# Patient Record
Sex: FEMALE | Race: BLACK OR AFRICAN AMERICAN | URBAN - METROPOLITAN AREA
[De-identification: names, ages, dates, MRNs, and addresses within clinical notes are randomized per-mention and may not be internally consistent; named-entity substitution may affect disease eponyms.]

---

## 2018-04-23 ENCOUNTER — HOSPITAL LABORATORY (OUTPATIENT)
Dept: OTHER | Facility: CLINIC | Age: 63
End: 2018-04-23

## 2018-04-23 ENCOUNTER — HOSPITAL ENCOUNTER (OUTPATIENT)
Dept: LAB | Facility: CLINIC | Age: 63
Discharge: HOME | End: 2018-04-23
Attending: ORTHOPAEDIC SURGERY | Admitting: ORTHOPAEDIC SURGERY
Payer: COMMERCIAL

## 2018-04-23 DIAGNOSIS — Z01.812 PRE-OPERATIVE LABORATORY EXAMINATION: ICD-10-CM

## 2018-04-23 LAB
CREAT SERPL-MCNC: 0.77 MG/DL (ref 0.52–1.04)
ERYTHROCYTE [DISTWIDTH] IN BLOOD BY AUTOMATED COUNT: 13.2 % (ref 10–15)
FERRITIN SERPL-MCNC: 40 NG/ML (ref 8–252)
GFR SERPL CREATININE-BSD FRML MDRD: 76 ML/MIN/1.7M2
HCT VFR BLD AUTO: 38.1 % (ref 35–47)
HGB BLD-MCNC: 12.7 G/DL (ref 11.7–15.7)
HGB BLD-MCNC: NORMAL G/DL (ref 11.7–15.7)
IRON SATN MFR SERPL: 25 % (ref 15–46)
IRON SERPL-MCNC: 77 UG/DL (ref 35–180)
MCH RBC QN AUTO: 30.5 PG (ref 26.5–33)
MCHC RBC AUTO-ENTMCNC: 33.3 G/DL (ref 31.5–36.5)
MCV RBC AUTO: 91 FL (ref 78–100)
MRSA DNA SPEC QL NAA+PROBE: NEGATIVE
PLATELET # BLD AUTO: 205 10E9/L (ref 150–450)
RBC # BLD AUTO: 4.17 10E12/L (ref 3.8–5.2)
RETICS # AUTO: 95.5 10E9/L (ref 25–95)
RETICS/RBC NFR AUTO: 2.3 % (ref 0.5–2)
SPECIMEN SOURCE: NORMAL
TIBC SERPL-MCNC: 313 UG/DL (ref 240–430)
WBC # BLD AUTO: 4.5 10E9/L (ref 4–11)

## 2018-04-23 PROCEDURE — 87641 MR-STAPH DNA AMP PROBE: CPT | Performed by: ORTHOPAEDIC SURGERY

## 2018-04-23 PROCEDURE — 40000830 ZZHCL STATISTIC STAPH AUREUS METH RESIST SCREEN PCR: Performed by: ORTHOPAEDIC SURGERY

## 2018-04-23 PROCEDURE — 40000829 ZZHCL STATISTIC STAPH AUREUS SUSCEPT SCREEN PCR: Performed by: ORTHOPAEDIC SURGERY

## 2018-04-23 PROCEDURE — 87640 STAPH A DNA AMP PROBE: CPT | Performed by: ORTHOPAEDIC SURGERY

## 2018-04-25 ENCOUNTER — TRANSFERRED RECORDS (OUTPATIENT)
Dept: HEALTH INFORMATION MANAGEMENT | Facility: CLINIC | Age: 63
End: 2018-04-25

## 2018-05-18 RX ORDER — MULTIVITAMIN,THERAPEUTIC
1 TABLET ORAL DAILY
Status: ON HOLD | COMMUNITY
End: 2018-05-21

## 2018-05-18 RX ORDER — LATANOPROST 50 UG/ML
1 SOLUTION/ DROPS OPHTHALMIC AT BEDTIME
COMMUNITY

## 2018-05-18 RX ORDER — BRIMONIDINE TARTRATE 1 MG/ML
1 SOLUTION/ DROPS OPHTHALMIC 2 TIMES DAILY
COMMUNITY

## 2018-05-18 RX ORDER — OXYCODONE AND ACETAMINOPHEN 5; 325 MG/1; MG/1
1 TABLET ORAL EVERY 6 HOURS PRN
Status: ON HOLD | COMMUNITY
End: 2018-05-21

## 2018-05-18 RX ORDER — TIMOLOL MALEATE 5 MG/ML
1 SOLUTION/ DROPS OPHTHALMIC EVERY MORNING
COMMUNITY

## 2018-05-21 ENCOUNTER — ANESTHESIA (OUTPATIENT)
Dept: SURGERY | Facility: CLINIC | Age: 63
DRG: 470 | End: 2018-05-21
Payer: COMMERCIAL

## 2018-05-21 ENCOUNTER — HOSPITAL ENCOUNTER (INPATIENT)
Facility: CLINIC | Age: 63
LOS: 3 days | Discharge: HOME | DRG: 470 | End: 2018-05-24
Attending: ORTHOPAEDIC SURGERY | Admitting: ORTHOPAEDIC SURGERY
Payer: COMMERCIAL

## 2018-05-21 ENCOUNTER — ANESTHESIA EVENT (OUTPATIENT)
Dept: SURGERY | Facility: CLINIC | Age: 63
DRG: 470 | End: 2018-05-21
Payer: COMMERCIAL

## 2018-05-21 ENCOUNTER — APPOINTMENT (OUTPATIENT)
Dept: GENERAL RADIOLOGY | Facility: CLINIC | Age: 63
DRG: 470 | End: 2018-05-21
Attending: ORTHOPAEDIC SURGERY
Payer: COMMERCIAL

## 2018-05-21 DIAGNOSIS — Z96.651 STATUS POST TOTAL RIGHT KNEE REPLACEMENT: Primary | ICD-10-CM

## 2018-05-21 PROBLEM — Z96.659 S/P TOTAL KNEE ARTHROPLASTY: Status: ACTIVE | Noted: 2018-05-21

## 2018-05-21 LAB
ANION GAP SERPL CALCULATED.3IONS-SCNC: 4 MMOL/L (ref 3–14)
BUN SERPL-MCNC: 14 MG/DL (ref 7–30)
CALCIUM SERPL-MCNC: 9.1 MG/DL (ref 8.5–10.1)
CHLORIDE SERPL-SCNC: 107 MMOL/L (ref 94–109)
CO2 SERPL-SCNC: 30 MMOL/L (ref 20–32)
CREAT SERPL-MCNC: 0.62 MG/DL (ref 0.52–1.04)
GFR SERPL CREATININE-BSD FRML MDRD: >90 ML/MIN/1.7M2
GLUCOSE SERPL-MCNC: 85 MG/DL (ref 70–99)
HGB BLD-MCNC: 13.4 G/DL (ref 11.7–15.7)
PLATELET # BLD AUTO: 171 10E9/L (ref 150–450)
POTASSIUM SERPL-SCNC: 3.5 MMOL/L (ref 3.4–5.3)
SODIUM SERPL-SCNC: 141 MMOL/L (ref 133–144)

## 2018-05-21 PROCEDURE — 0SRC0J9 REPLACEMENT OF RIGHT KNEE JOINT WITH SYNTHETIC SUBSTITUTE, CEMENTED, OPEN APPROACH: ICD-10-PCS | Performed by: ORTHOPAEDIC SURGERY

## 2018-05-21 PROCEDURE — C1776 JOINT DEVICE (IMPLANTABLE): HCPCS | Performed by: ORTHOPAEDIC SURGERY

## 2018-05-21 PROCEDURE — 36000063 ZZH SURGERY LEVEL 4 EA 15 ADDTL MIN: Performed by: ORTHOPAEDIC SURGERY

## 2018-05-21 PROCEDURE — 25000128 H RX IP 250 OP 636: Performed by: ORTHOPAEDIC SURGERY

## 2018-05-21 PROCEDURE — 37000008 ZZH ANESTHESIA TECHNICAL FEE, 1ST 30 MIN: Performed by: ORTHOPAEDIC SURGERY

## 2018-05-21 PROCEDURE — 40000170 ZZH STATISTIC PRE-PROCEDURE ASSESSMENT II: Performed by: ORTHOPAEDIC SURGERY

## 2018-05-21 PROCEDURE — 27210794 ZZH OR GENERAL SUPPLY STERILE: Performed by: ORTHOPAEDIC SURGERY

## 2018-05-21 PROCEDURE — 36415 COLL VENOUS BLD VENIPUNCTURE: CPT | Performed by: ORTHOPAEDIC SURGERY

## 2018-05-21 PROCEDURE — 25000128 H RX IP 250 OP 636: Performed by: ANESTHESIOLOGY

## 2018-05-21 PROCEDURE — 27110028 ZZH OR GENERAL SUPPLY NON-STERILE: Performed by: ORTHOPAEDIC SURGERY

## 2018-05-21 PROCEDURE — 25000125 ZZHC RX 250: Performed by: ORTHOPAEDIC SURGERY

## 2018-05-21 PROCEDURE — 37000009 ZZH ANESTHESIA TECHNICAL FEE, EACH ADDTL 15 MIN: Performed by: ORTHOPAEDIC SURGERY

## 2018-05-21 PROCEDURE — 71000012 ZZH RECOVERY PHASE 1 LEVEL 1 FIRST HR: Performed by: ORTHOPAEDIC SURGERY

## 2018-05-21 PROCEDURE — 25000132 ZZH RX MED GY IP 250 OP 250 PS 637: Performed by: ORTHOPAEDIC SURGERY

## 2018-05-21 PROCEDURE — 27210995 ZZH RX 272: Performed by: ORTHOPAEDIC SURGERY

## 2018-05-21 PROCEDURE — 25000128 H RX IP 250 OP 636: Performed by: NURSE ANESTHETIST, CERTIFIED REGISTERED

## 2018-05-21 PROCEDURE — 27810169 ZZH OR IMPLANT GENERAL: Performed by: ORTHOPAEDIC SURGERY

## 2018-05-21 PROCEDURE — 36000093 ZZH SURGERY LEVEL 4 1ST 30 MIN: Performed by: ORTHOPAEDIC SURGERY

## 2018-05-21 PROCEDURE — 85049 AUTOMATED PLATELET COUNT: CPT | Performed by: ORTHOPAEDIC SURGERY

## 2018-05-21 PROCEDURE — 25800025 ZZH RX 258: Performed by: ORTHOPAEDIC SURGERY

## 2018-05-21 PROCEDURE — 80048 BASIC METABOLIC PNL TOTAL CA: CPT | Performed by: ORTHOPAEDIC SURGERY

## 2018-05-21 PROCEDURE — 25000125 ZZHC RX 250: Performed by: NURSE ANESTHETIST, CERTIFIED REGISTERED

## 2018-05-21 PROCEDURE — 12000007 ZZH R&B INTERMEDIATE

## 2018-05-21 PROCEDURE — 85018 HEMOGLOBIN: CPT | Performed by: ORTHOPAEDIC SURGERY

## 2018-05-21 PROCEDURE — 40000986 XR KNEE PORT RT 1/2 VW: Mod: RT

## 2018-05-21 PROCEDURE — 25000566 ZZH SEVOFLURANE, EA 15 MIN: Performed by: ORTHOPAEDIC SURGERY

## 2018-05-21 DEVICE — IMP BASEPLATE TIBIAL GENESIS II SZ 4 RT TI 71420184: Type: IMPLANTABLE DEVICE | Site: KNEE | Status: FUNCTIONAL

## 2018-05-21 DEVICE — IMP BONE CEMENT SIMPLEX W/GENTAMICIN 6195-1-001: Type: IMPLANTABLE DEVICE | Site: KNEE | Status: FUNCTIONAL

## 2018-05-21 DEVICE — IMP COMP PATELLA SNR RESURF GENESIS II 9X26MM 71420580: Type: IMPLANTABLE DEVICE | Site: KNEE | Status: FUNCTIONAL

## 2018-05-21 DEVICE — IMP COMP FEMORAL S&N LEGION PS NP NARROW 4 RT 71933653: Type: IMPLANTABLE DEVICE | Site: KNEE | Status: FUNCTIONAL

## 2018-05-21 RX ORDER — AMOXICILLIN 250 MG
2 CAPSULE ORAL 2 TIMES DAILY
Status: DISCONTINUED | OUTPATIENT
Start: 2018-05-21 | End: 2018-05-24 | Stop reason: HOSPADM

## 2018-05-21 RX ORDER — DIPHENHYDRAMINE HYDROCHLORIDE 50 MG/ML
25 INJECTION INTRAMUSCULAR; INTRAVENOUS EVERY 6 HOURS PRN
Status: DISCONTINUED | OUTPATIENT
Start: 2018-05-21 | End: 2018-05-24 | Stop reason: HOSPADM

## 2018-05-21 RX ORDER — DEXAMETHASONE SODIUM PHOSPHATE 4 MG/ML
INJECTION, SOLUTION INTRA-ARTICULAR; INTRALESIONAL; INTRAMUSCULAR; INTRAVENOUS; SOFT TISSUE PRN
Status: DISCONTINUED | OUTPATIENT
Start: 2018-05-21 | End: 2018-05-21

## 2018-05-21 RX ORDER — ONDANSETRON 2 MG/ML
4 INJECTION INTRAMUSCULAR; INTRAVENOUS EVERY 6 HOURS PRN
Status: DISCONTINUED | OUTPATIENT
Start: 2018-05-21 | End: 2018-05-24 | Stop reason: HOSPADM

## 2018-05-21 RX ORDER — BRIMONIDINE TARTRATE 1 MG/ML
1 SOLUTION/ DROPS OPHTHALMIC 2 TIMES DAILY
Status: DISCONTINUED | OUTPATIENT
Start: 2018-05-21 | End: 2018-05-24 | Stop reason: HOSPADM

## 2018-05-21 RX ORDER — HYDRALAZINE HYDROCHLORIDE 20 MG/ML
10 INJECTION INTRAMUSCULAR; INTRAVENOUS EVERY 4 HOURS PRN
Status: DISCONTINUED | OUTPATIENT
Start: 2018-05-21 | End: 2018-05-21

## 2018-05-21 RX ORDER — HYDROMORPHONE HYDROCHLORIDE 1 MG/ML
.3-.5 INJECTION, SOLUTION INTRAMUSCULAR; INTRAVENOUS; SUBCUTANEOUS
Status: DISCONTINUED | OUTPATIENT
Start: 2018-05-21 | End: 2018-05-24 | Stop reason: HOSPADM

## 2018-05-21 RX ORDER — FENTANYL CITRATE 50 UG/ML
25-50 INJECTION, SOLUTION INTRAMUSCULAR; INTRAVENOUS
Status: DISCONTINUED | OUTPATIENT
Start: 2018-05-21 | End: 2018-05-21 | Stop reason: HOSPADM

## 2018-05-21 RX ORDER — SODIUM CHLORIDE, SODIUM LACTATE, POTASSIUM CHLORIDE, CALCIUM CHLORIDE 600; 310; 30; 20 MG/100ML; MG/100ML; MG/100ML; MG/100ML
INJECTION, SOLUTION INTRAVENOUS CONTINUOUS
Status: DISCONTINUED | OUTPATIENT
Start: 2018-05-21 | End: 2018-05-21

## 2018-05-21 RX ORDER — LIDOCAINE 40 MG/G
CREAM TOPICAL
Status: DISCONTINUED | OUTPATIENT
Start: 2018-05-21 | End: 2018-05-24 | Stop reason: HOSPADM

## 2018-05-21 RX ORDER — DIPHENHYDRAMINE HCL 25 MG
25 CAPSULE ORAL EVERY 6 HOURS PRN
Status: DISCONTINUED | OUTPATIENT
Start: 2018-05-21 | End: 2018-05-24 | Stop reason: HOSPADM

## 2018-05-21 RX ORDER — DEXTROSE MONOHYDRATE, SODIUM CHLORIDE, AND POTASSIUM CHLORIDE 50; 1.49; 4.5 G/1000ML; G/1000ML; G/1000ML
INJECTION, SOLUTION INTRAVENOUS CONTINUOUS
Status: DISCONTINUED | OUTPATIENT
Start: 2018-05-21 | End: 2018-05-24 | Stop reason: HOSPADM

## 2018-05-21 RX ORDER — ONDANSETRON 4 MG/1
4 TABLET, ORALLY DISINTEGRATING ORAL EVERY 30 MIN PRN
Status: DISCONTINUED | OUTPATIENT
Start: 2018-05-21 | End: 2018-05-21 | Stop reason: HOSPADM

## 2018-05-21 RX ORDER — PROPOFOL 10 MG/ML
INJECTION, EMULSION INTRAVENOUS CONTINUOUS PRN
Status: DISCONTINUED | OUTPATIENT
Start: 2018-05-21 | End: 2018-05-21

## 2018-05-21 RX ORDER — ONDANSETRON 2 MG/ML
4 INJECTION INTRAMUSCULAR; INTRAVENOUS EVERY 30 MIN PRN
Status: DISCONTINUED | OUTPATIENT
Start: 2018-05-21 | End: 2018-05-21 | Stop reason: HOSPADM

## 2018-05-21 RX ORDER — ACETAMINOPHEN 325 MG/1
650 TABLET ORAL EVERY 4 HOURS PRN
Status: DISCONTINUED | OUTPATIENT
Start: 2018-05-24 | End: 2018-05-24 | Stop reason: HOSPADM

## 2018-05-21 RX ORDER — ZOLPIDEM TARTRATE 5 MG/1
5 TABLET ORAL
Status: DISCONTINUED | OUTPATIENT
Start: 2018-05-22 | End: 2018-05-24 | Stop reason: HOSPADM

## 2018-05-21 RX ORDER — ACETAMINOPHEN 325 MG/1
975 TABLET ORAL EVERY 8 HOURS
Status: DISCONTINUED | OUTPATIENT
Start: 2018-05-21 | End: 2018-05-24 | Stop reason: HOSPADM

## 2018-05-21 RX ORDER — CYCLOBENZAPRINE HCL 10 MG
10 TABLET ORAL 3 TIMES DAILY PRN
Status: DISCONTINUED | OUTPATIENT
Start: 2018-05-21 | End: 2018-05-24 | Stop reason: HOSPADM

## 2018-05-21 RX ORDER — HYDROMORPHONE HYDROCHLORIDE 1 MG/ML
.3-.5 INJECTION, SOLUTION INTRAMUSCULAR; INTRAVENOUS; SUBCUTANEOUS EVERY 5 MIN PRN
Status: DISCONTINUED | OUTPATIENT
Start: 2018-05-21 | End: 2018-05-21 | Stop reason: HOSPADM

## 2018-05-21 RX ORDER — TIMOLOL MALEATE 5 MG/ML
1 SOLUTION/ DROPS OPHTHALMIC EVERY MORNING
Status: DISCONTINUED | OUTPATIENT
Start: 2018-05-22 | End: 2018-05-24 | Stop reason: HOSPADM

## 2018-05-21 RX ORDER — LATANOPROST 50 UG/ML
1 SOLUTION/ DROPS OPHTHALMIC AT BEDTIME
Status: DISCONTINUED | OUTPATIENT
Start: 2018-05-21 | End: 2018-05-24 | Stop reason: HOSPADM

## 2018-05-21 RX ORDER — CEFAZOLIN SODIUM 1 G/3ML
1 INJECTION, POWDER, FOR SOLUTION INTRAMUSCULAR; INTRAVENOUS SEE ADMIN INSTRUCTIONS
Status: DISCONTINUED | OUTPATIENT
Start: 2018-05-21 | End: 2018-05-21

## 2018-05-21 RX ORDER — CEFAZOLIN SODIUM 2 G/100ML
2 INJECTION, SOLUTION INTRAVENOUS
Status: COMPLETED | OUTPATIENT
Start: 2018-05-21 | End: 2018-05-21

## 2018-05-21 RX ORDER — AMOXICILLIN 250 MG
1 CAPSULE ORAL 2 TIMES DAILY
Status: DISCONTINUED | OUTPATIENT
Start: 2018-05-21 | End: 2018-05-24 | Stop reason: HOSPADM

## 2018-05-21 RX ORDER — HYDROMORPHONE HYDROCHLORIDE 2 MG/1
2-4 TABLET ORAL
Status: DISCONTINUED | OUTPATIENT
Start: 2018-05-21 | End: 2018-05-22 | Stop reason: CLARIF

## 2018-05-21 RX ORDER — PROCHLORPERAZINE MALEATE 5 MG
10 TABLET ORAL EVERY 6 HOURS PRN
Status: DISCONTINUED | OUTPATIENT
Start: 2018-05-21 | End: 2018-05-24 | Stop reason: HOSPADM

## 2018-05-21 RX ORDER — KETOROLAC TROMETHAMINE 30 MG/ML
30 INJECTION, SOLUTION INTRAMUSCULAR; INTRAVENOUS EVERY 6 HOURS
Status: COMPLETED | OUTPATIENT
Start: 2018-05-21 | End: 2018-05-22

## 2018-05-21 RX ORDER — ONDANSETRON 4 MG/1
4 TABLET, ORALLY DISINTEGRATING ORAL EVERY 6 HOURS PRN
Status: DISCONTINUED | OUTPATIENT
Start: 2018-05-21 | End: 2018-05-24 | Stop reason: HOSPADM

## 2018-05-21 RX ORDER — NALOXONE HYDROCHLORIDE 0.4 MG/ML
.1-.4 INJECTION, SOLUTION INTRAMUSCULAR; INTRAVENOUS; SUBCUTANEOUS
Status: DISCONTINUED | OUTPATIENT
Start: 2018-05-21 | End: 2018-05-21

## 2018-05-21 RX ORDER — HYDRALAZINE HYDROCHLORIDE 20 MG/ML
10 INJECTION INTRAMUSCULAR; INTRAVENOUS EVERY 4 HOURS PRN
Status: DISCONTINUED | OUTPATIENT
Start: 2018-05-21 | End: 2018-05-24 | Stop reason: HOSPADM

## 2018-05-21 RX ORDER — CEFAZOLIN SODIUM 1 G/3ML
1 INJECTION, POWDER, FOR SOLUTION INTRAMUSCULAR; INTRAVENOUS EVERY 8 HOURS
Status: COMPLETED | OUTPATIENT
Start: 2018-05-21 | End: 2018-05-22

## 2018-05-21 RX ORDER — SODIUM CHLORIDE, SODIUM LACTATE, POTASSIUM CHLORIDE, CALCIUM CHLORIDE 600; 310; 30; 20 MG/100ML; MG/100ML; MG/100ML; MG/100ML
INJECTION, SOLUTION INTRAVENOUS CONTINUOUS
Status: DISCONTINUED | OUTPATIENT
Start: 2018-05-21 | End: 2018-05-21 | Stop reason: HOSPADM

## 2018-05-21 RX ORDER — MAGNESIUM HYDROXIDE 1200 MG/15ML
LIQUID ORAL PRN
Status: DISCONTINUED | OUTPATIENT
Start: 2018-05-21 | End: 2018-05-21 | Stop reason: HOSPADM

## 2018-05-21 RX ORDER — AMLODIPINE BESYLATE 5 MG/1
5 TABLET ORAL EVERY MORNING
Status: DISCONTINUED | OUTPATIENT
Start: 2018-05-22 | End: 2018-05-24 | Stop reason: HOSPADM

## 2018-05-21 RX ORDER — NALOXONE HYDROCHLORIDE 0.4 MG/ML
.1-.4 INJECTION, SOLUTION INTRAMUSCULAR; INTRAVENOUS; SUBCUTANEOUS
Status: DISCONTINUED | OUTPATIENT
Start: 2018-05-21 | End: 2018-05-24 | Stop reason: HOSPADM

## 2018-05-21 RX ADMIN — PHENYLEPHRINE HYDROCHLORIDE 100 MCG: 10 INJECTION, SOLUTION INTRAMUSCULAR; INTRAVENOUS; SUBCUTANEOUS at 15:02

## 2018-05-21 RX ADMIN — HYDROMORPHONE HYDROCHLORIDE 0.5 MG: 1 INJECTION, SOLUTION INTRAMUSCULAR; INTRAVENOUS; SUBCUTANEOUS at 17:56

## 2018-05-21 RX ADMIN — FENTANYL CITRATE 100 MCG: 50 INJECTION, SOLUTION INTRAMUSCULAR; INTRAVENOUS at 13:12

## 2018-05-21 RX ADMIN — DEXAMETHASONE SODIUM PHOSPHATE 4 MG: 4 INJECTION, SOLUTION INTRA-ARTICULAR; INTRALESIONAL; INTRAMUSCULAR; INTRAVENOUS; SOFT TISSUE at 13:16

## 2018-05-21 RX ADMIN — CEFAZOLIN SODIUM 2 G: 2 INJECTION, SOLUTION INTRAVENOUS at 13:15

## 2018-05-21 RX ADMIN — PROPOFOL 30 MCG/KG/MIN: 10 INJECTION, EMULSION INTRAVENOUS at 13:20

## 2018-05-21 RX ADMIN — CEFAZOLIN SODIUM 1 G: 1 INJECTION, POWDER, FOR SOLUTION INTRAMUSCULAR; INTRAVENOUS at 20:18

## 2018-05-21 RX ADMIN — SODIUM CHLORIDE, POTASSIUM CHLORIDE, SODIUM LACTATE AND CALCIUM CHLORIDE: 600; 310; 30; 20 INJECTION, SOLUTION INTRAVENOUS at 13:59

## 2018-05-21 RX ADMIN — MIDAZOLAM 1 MG: 1 INJECTION INTRAMUSCULAR; INTRAVENOUS at 13:05

## 2018-05-21 RX ADMIN — PHENYLEPHRINE HYDROCHLORIDE 100 MCG: 10 INJECTION, SOLUTION INTRAMUSCULAR; INTRAVENOUS; SUBCUTANEOUS at 13:16

## 2018-05-21 RX ADMIN — HYDROMORPHONE HYDROCHLORIDE 0.5 MG: 10 INJECTION, SOLUTION INTRAMUSCULAR; INTRAVENOUS; SUBCUTANEOUS at 13:47

## 2018-05-21 RX ADMIN — SENNOSIDES AND DOCUSATE SODIUM 1 TABLET: 8.6; 5 TABLET ORAL at 21:49

## 2018-05-21 RX ADMIN — HYDROMORPHONE HYDROCHLORIDE 0.5 MG: 1 INJECTION, SOLUTION INTRAMUSCULAR; INTRAVENOUS; SUBCUTANEOUS at 21:49

## 2018-05-21 RX ADMIN — ONDANSETRON 4 MG: 2 INJECTION INTRAMUSCULAR; INTRAVENOUS at 15:08

## 2018-05-21 RX ADMIN — PHENYLEPHRINE HYDROCHLORIDE 100 MCG: 10 INJECTION, SOLUTION INTRAMUSCULAR; INTRAVENOUS; SUBCUTANEOUS at 13:18

## 2018-05-21 RX ADMIN — SODIUM CHLORIDE, POTASSIUM CHLORIDE, SODIUM LACTATE AND CALCIUM CHLORIDE: 600; 310; 30; 20 INJECTION, SOLUTION INTRAVENOUS at 12:44

## 2018-05-21 RX ADMIN — ACETAMINOPHEN 975 MG: 325 TABLET ORAL at 17:56

## 2018-05-21 RX ADMIN — FENTANYL CITRATE 50 MCG: 50 INJECTION, SOLUTION INTRAMUSCULAR; INTRAVENOUS at 14:27

## 2018-05-21 RX ADMIN — PHENYLEPHRINE HYDROCHLORIDE 100 MCG: 10 INJECTION, SOLUTION INTRAMUSCULAR; INTRAVENOUS; SUBCUTANEOUS at 14:59

## 2018-05-21 RX ADMIN — KETOROLAC TROMETHAMINE 30 MG: 30 INJECTION, SOLUTION INTRAMUSCULAR at 21:49

## 2018-05-21 RX ADMIN — KETOROLAC TROMETHAMINE 30 MG: 30 INJECTION, SOLUTION INTRAMUSCULAR at 16:21

## 2018-05-21 RX ADMIN — POTASSIUM CHLORIDE, DEXTROSE MONOHYDRATE AND SODIUM CHLORIDE: 150; 5; 450 INJECTION, SOLUTION INTRAVENOUS at 17:10

## 2018-05-21 RX ADMIN — TRANEXAMIC ACID 1 G: 100 INJECTION, SOLUTION INTRAVENOUS at 15:07

## 2018-05-21 RX ADMIN — PHENYLEPHRINE HYDROCHLORIDE 100 MCG: 10 INJECTION, SOLUTION INTRAMUSCULAR; INTRAVENOUS; SUBCUTANEOUS at 15:14

## 2018-05-21 RX ADMIN — PHENYLEPHRINE HYDROCHLORIDE 100 MCG: 10 INJECTION, SOLUTION INTRAMUSCULAR; INTRAVENOUS; SUBCUTANEOUS at 13:22

## 2018-05-21 ASSESSMENT — LIFESTYLE VARIABLES: TOBACCO_USE: 0

## 2018-05-21 NOTE — IP AVS SNAPSHOT
MRN:[id number]                      After Visit Summary   5/21/2018    Thelma Leoah    MRN: [id number]           Thank you!     Thank you for choosing [facility] for your care. Our goal is always to provide you with excellent care. Hearing back from our patients is one way we can continue to improve our services. Please take a few minutes to complete the written survey that you may receive in the mail after you visit with us. Thank you!        Patient Information     Date Of Birth          1955        Designated Caregiver       Most Recent Value    Caregiver    Will someone help with your care after discharge? yes    Name of designated caregiver Franck     Phone number of caregiver     Caregiver address [address] [phone]      About your hospital stay     You were admitted on:  May 21, 2018 You last received care in the:  [facility] [address] [facility]    You were discharged on:  May 24, 2018        Reason for your hospital stay       TKA                  Who to Call     For medical emergencies, please call 911.  For non-urgent questions about your medical care, please call your primary care provider or clinic, [phone]  For questions related to your surgery, please call your surgery clinic        Attending Provider     Provider Kalyn Brock MD Orthopedics       Primary Care Provider Office Phone # Fax #    Ncha D Gerson [phone]      Follow-up Appointments     Follow-up and recommended labs and tests        Office visit prearranged                  Further instructions from your care team       DISCHARGE INSTRUCTIONS AFTER YOUR TOTAL KNEE REPLACEMENT     KALYN MAGAÑA MD       Instructions to care for your wound at home:   Change the dressing daily.  Inspect your incision at the time of dressing change for increased redness, tenderness, swelling, or drainage along the incision line.  Some bruising  or discoloration is usually present, but call my office for any changes in appearance that concern you.  Also call if you develop a fever above 101 degrees.     You may shower directly over the wound beginning 4 days after your surgery, but do not submerge the wound under water until after your post operative visit when the sutures are removed and the wound is completely sealed without drainage.    Activities:  Physical activity may be resumed gradually according to your comfort level. You may bear weight on your operative leg as tolerated with your crutches or walker as instructed by your therapist.  Follow your home exercise program.  Ice your knee after exercising.        Wear your knee immobilizer at night only until your office visit in 2 weeks to maintain full knee extension.  Do not use the immobilizer during the day unless otherwise instructed.      Wear the anti-embolism stockings day and night until seen in the office for your post operative visit. Remove them twice daily for one hour at a time. Keep the compression stockings flat on your leg.  Do not allow them to roll up or crease your skin.  Call if you develop calf pain.     Outpatient Physical Therapy and home exercises:  Outpatient physical therapy visits are required following discharge from the hospital. The referral for these outpatient therapy visits is routinely given to you at the time of your surgical scheduling. You should have already scheduled your therapy sessions in advance.  If you have not done so, please immediately call the therapy site of your choice to schedule the physical therapy regimen that has been prescribed for you.  You may discontinue the crutches or walker per the therapist's recommendation.      Medications:  New medications for you on discharge will include a pain medication, a stool softener while on the narcotic pain medication, and a blood thinner.  Detailed instructions will come with those medications.  You will  "also receive instructions on when to resume your home medications.     If you routinely take Aspirin 81 mg, hold the Aspirin while taking the formal blood thinner medication. Then take Aspirin 325 mg (1 tablet) daily for 4 weeks.  Then resume your Aspirin 81 mg daily if that is your routine.        Antibiotic coverage will be needed before any type of dental procedure.  This is a life long recommendation.  You should notify your dentist of your total knee surgery and call your dentist or our office one week before a dental appointment for antibiotics.        Clinic follow-up appointment:  Your clinic follow-up appointment has been prearranged.  Call [phone] with any questions.    Jimi Romero MD     Pending Results     No orders found from 5/19/2018 to 5/22/2018.            Statement of Approval     Ordered          05/23/18 0012  I have reviewed and agree with all the recommendations and orders detailed in this document.  EFFECTIVE NOW     Approved and electronically signed by:  Jimi Romero MD             Admission Information     Date & Time Provider Department Dept. Phone    5/21/2018 Jimi Romero MD [facility] [address] [facility] [phone]      Your Vitals Were     Blood Pressure Temperature Respirations Height Weight Pulse Oximetry    106/65 (BP Location: Left arm) 97.1  F (36.2  C) (Oral) 16 1.676 m (5' 6\") 57.6 kg (127 lb) 97%    BMI (Body Mass Index)                   20.5 kg/m2           [employer] Information     [employer] lets you send messages to your doctor, view your test results, renew your prescriptions, schedule appointments and more. To sign up, go to [email or web address] . Click on \"Log in\" on the left side of the screen, which will take you to the Welcome page. Then click on \"Sign up Now\" on the right side of the page.     You will be asked to enter the access code listed below, as well as some personal information. Please follow the directions to " create your username and password.     Your access code is: [email or web address]  Expires: 2018  4:08 PM     Your access code will  in 90 days. If you need help or a new code, please call your [facility] clinic or [phone].        Care EveryWhere ID     This is your Care EveryWhere ID. This could be used by other organizations to access your [facility] medical records  [id number]        Equal Access to Services     RIANNA GUAMAN : Hadii aad ku hadasho Soomaali, waaxda luqadaha, qaybta kaalmada adeegyada, waxay idiin hayaan adenicole villasenorjuanbrenda ch . So [facility] [phone].    ATENCIÓN: Si habla español, tiene a corona disposición servicios gratuitos de asistencia lingüística. Llame al [phone].    We comply with applicable federal civil rights laws and [place] laws. We do not discriminate on the basis of race, color, national origin, age, disability, sex, sexual orientation, or gender identity.               Review of your medicines      START taking        Dose / Directions    enoxaparin 40 MG/0.4ML injection   Commonly known as:  LOVENOX        Dose:  40 mg   Inject 0.4 mLs (40 mg) Subcutaneous every 24 hours for 10 days   Quantity:  4 mL   Refills:  0       HYDROmorphone 2 MG tablet   Commonly known as:  DILAUDID        Dose:  2-4 mg   Take 1-2 tablets (2-4 mg) by mouth every 3 hours as needed for moderate to severe pain   Quantity:  30 tablet   Refills:  0       ondansetron 4 MG ODT tab   Commonly known as:  ZOFRAN-ODT        Dose:  4 mg   Take 1 tablet (4 mg) by mouth every 6 hours as needed for nausea or vomiting   Quantity:  20 tablet   Refills:  0       order for DME        Equipment being ordered: Walker Wheels () and Walker () Treatment Diagnosis: difficulty walking   Quantity:  1 each   Refills:  0       senna-docusate 8.6-50 MG per tablet   Commonly known as:  SENOKOT-S;PERICOLACE        Dose:  2 tablet   Take 2 tablets by mouth 2 times daily   Quantity:  50 tablet   Refills:  0          CONTINUE these medicines which have NOT CHANGED        Dose / Directions    AMLODIPINE BESYLATE PO        Dose:  5 mg   Take 5 mg by mouth every morning   Refills:  0       brimonidine 0.1 % ophthalmic solution   Commonly known as:  ALPHAGAN P        Dose:  1 drop   Place 1 drop into both eyes 2 times daily   Refills:  0       capsicum oleoresin 0.025 % cream        Apply topically 4 times daily as needed (pain)   Refills:  0       latanoprost 0.005 % ophthalmic solution   Commonly known as:  XALATAN        Dose:  1 drop   Place 1 drop into both eyes At Bedtime   Refills:  0       timolol 0.5 % ophthalmic solution   Commonly known as:  TIMOPTIC        Dose:  1 drop   Place 1 drop into both eyes every morning   Refills:  0       TYLENOL PO        Dose:  325-650 mg   Take 325-650 mg by mouth every 4 hours as needed for mild pain or fever   Refills:  0         STOP taking     IBUPROFEN PO           NAPROXEN PO                Where to get your medicines      These medications were sent to [facility] - [address]  [address], [place] [address]     Phone:  [phone]     enoxaparin 40 MG/0.4ML injection    ondansetron 4 MG ODT tab    senna-docusate 8.6-50 MG per tablet         Some of these will need a paper prescription and others can be bought over the counter. Ask your nurse if you have questions.     Bring a paper prescription for each of these medications     HYDROmorphone 2 MG tablet    order for DME                Protect others around you: Learn how to safely use, store and throw away your medicines at www.disposemymeds.org.        Information about OPIOIDS     PRESCRIPTION OPIOIDS: WHAT YOU NEED TO KNOW   You have a prescription for an opioid (narcotic) pain medicine. Opioids can cause addiction. If you have a history of chemical dependency of any type, you are at a higher risk of becoming addicted to opioids. Only take this medicine after all other options  have been tried. Take it for as short a time and as few doses as possible.     Do not:    Drive. If you drive while taking these medicines, you could be arrested for driving under the influence (DUI).    Operate heavy machinery    Do any other dangerous activities while taking these medicines.     Drink any alcohol while taking these medicines.      Take with any other medicines that contain acetaminophen. Read all labels carefully. Look for the word  acetaminophen  or  Tylenol.  Ask your pharmacist if you have questions or are unsure.    Store your pills in a secure place, locked if possible. We will not replace any lost or stolen medicine. If you don t finish your medicine, please throw away (dispose) as directed by your pharmacist. The [place] Pollution Control Agency has more information about safe disposal: https://www.pca.[facility].[place].us/living-green/managing-unwanted-medications    All opioids tend to cause constipation. Drink plenty of water and eat foods that have a lot of fiber, such as fruits, vegetables, prune juice, apple juice and high-fiber cereal. Take a laxative (Miralax, milk of magnesia, Colace, Senna) if you don t move your bowels at least every other day.              Medication List: This is a list of all your medications and when to take them. Check marks below indicate your daily home schedule. Keep this list as a reference.      Medications           Morning Afternoon Evening Bedtime As Needed    AMLODIPINE BESYLATE PO   Take 5 mg by mouth every morning   Last time this was given:  5 mg on 5/24/2018  8:10 AM   Next Dose Due:  5/25/18 AM            5/25/18                       brimonidine 0.1 % ophthalmic solution   Commonly known as:  ALPHAGAN P   Place 1 drop into both eyes 2 times daily   Last time this was given:  1 drop on 5/23/2018  8:01 PM   Next Dose Due:  5/24/18 PM                    5/24/18               capsicum oleoresin 0.025 % cream   Apply topically 4 times daily as needed  (pain)   Next Dose Due:  Available as needed                                   enoxaparin 40 MG/0.4ML injection   Commonly known as:  LOVENOX   Inject 0.4 mLs (40 mg) Subcutaneous every 24 hours for 10 days   Last time this was given:  40 mg on 5/24/2018 11:17 AM   Next Dose Due:  5/25/18 11 AM                5/25/18                   HYDROmorphone 2 MG tablet   Commonly known as:  DILAUDID   Take 1-2 tablets (2-4 mg) by mouth every 3 hours as needed for moderate to severe pain   Last time this was given:  4 mg on 5/24/2018  1:23 PM   Next Dose Due:  Available as needed                                   latanoprost 0.005 % ophthalmic solution   Commonly known as:  XALATAN   Place 1 drop into both eyes At Bedtime   Last time this was given:  1 drop on 5/22/2018  9:51 PM   Next Dose Due:  5/25/18 Bedtime                        5/25/18           ondansetron 4 MG ODT tab   Commonly known as:  ZOFRAN-ODT   Take 1 tablet (4 mg) by mouth every 6 hours as needed for nausea or vomiting   Last time this was given:  4 mg on 5/23/2018  4:00 PM   Next Dose Due:  Available as needed                                   order for DME   Equipment being ordered: Walker Wheels () and Walker () Treatment Diagnosis: difficulty walking                                senna-docusate 8.6-50 MG per tablet   Commonly known as:  SENOKOT-S;PERICOLACE   Take 2 tablets by mouth 2 times daily   Last time this was given:  2 tablets on 5/24/2018  8:10 AM   Next Dose Due:  5/25/18 PM                    5/25/18               timolol 0.5 % ophthalmic solution   Commonly known as:  TIMOPTIC   Place 1 drop into both eyes every morning   Last time this was given:  1 drop on 5/22/2018  8:09 AM   Next Dose Due:  5/25/18 AM            5/25/18                       TYLENOL PO   Take 325-650 mg by mouth every 4 hours as needed for mild pain or fever   Last time this was given:  975 mg on 5/24/2018 11:16 AM   Next Dose Due:  Available as needed

## 2018-05-21 NOTE — PROGRESS NOTES
Admission medication history interview status for the 5/21/2018  admission is complete. See EPIC admission navigator for prior to admission medications     Medication history source reliability:Good    Medication history interview source(s):Daughter    Medication history resources (including written lists, pill bottles, clinic record):None    Primary pharmacy.[facility]    Additional medication history information not noted on PTA med list :None    Time spent in this activity: 30 minutes    Prior to Admission medications    Medication Sig Last Dose Taking? Auth Provider   Acetaminophen (TYLENOL PO) Take 325-650 mg by mouth every 4 hours as needed for mild pain or fever Over 2 weeks ago at prn Yes Reported, Patient   AMLODIPINE BESYLATE PO Take 5 mg by mouth every morning  5/21/2018 at 0500 Yes Reported, Patient   brimonidine (ALPHAGAN P) 0.1 % ophthalmic solution Place 1 drop into both eyes 2 times daily 5/20/2018 at am Yes Reported, Patient   capsicum oleoresin 0.025 % cream Apply topically 4 times daily as needed (pain) Over 2 weeks ago at prn Yes Reported, Patient   IBUPROFEN PO Take 400 mg by mouth 3 times daily as needed  Over 1 month ago at prn Yes Reported, Patient   latanoprost (XALATAN) 0.005 % ophthalmic solution Place 1 drop into both eyes At Bedtime 5/19/2018 at hs Yes Reported, Patient   NAPROXEN PO Take 500 mg by mouth 2 times daily as needed for moderate pain Over 2 weeks ago at prn Yes Reported, Patient   timolol (TIMOPTIC) 0.5 % ophthalmic solution Place 1 drop into both eyes every morning  5/20/2018 at am Yes Reported, Patient

## 2018-05-21 NOTE — PROVIDER NOTIFICATION
Hospitalist service consulted for post-op medical co-management. Patient is POD 0 s/p right total knee arthroplasty for OA. Pre-op H&P reviewed. She has a past medical history significant for hypertension and glaucoma. Prior to admission she on a regimen of eye drops for her glaucoma and amlodipine 5mg for blood pressures. BP has been stable throughout the day. Will order her amlodipine to be resumed 5/22 with hold parameters. I will also order prn hydralazine for SBp >180. Discussed with nursing who has no concerns at this time, patient is doing well.   Will defer formal consult at this time. Please do not hesitate to call with any questions or concerns. We are more than happy to see the patient if issues arise.    Inez Grossman PA-C

## 2018-05-21 NOTE — IP AVS SNAPSHOT
[address] [facility]    [address] [place] [address]    Phone:  [phone]                                       After Visit Summary   5/21/2018    Thelma Leon    MRN: [id number]           After Visit Summary Signature Page     I have received my discharge instructions, and my questions have been answered. I have discussed any challenges I see with this plan with the nurse or doctor.    ..........................................................................................................................................  Patient/Patient Representative Signature      ..........................................................................................................................................  Patient Representative Print Name and Relationship to Patient    ..................................................               ................................................  Date                                            Time    ..........................................................................................................................................  Reviewed by Signature/Title    ...................................................              ..............................................  Date                                                            Time

## 2018-05-21 NOTE — OP NOTE
PATIENT NAME:  Thelma Crowley [facility]  MEDICAL RECORD #: [id number]  PATIENT BIRTHDAY:  1955  DATE OF SURGERY: 5/21/2018    SURGEON:    Jimi Romero MD    1st ASSISTANT:  RHETT Sanders OPA-C    PREOPERATIVE DIAGNOSIS:  Degenerative osteoarthritis right knee.    POSTOPERATIVE DIAGNOSIS:  Degenerative osteoarthritis right knee.    PROCEDURE: right total knee arthroplasty.    COMPONENTS: [employer] - Size 4N PS femoral component, size 4 fully stemmed tibial baseplate with 21 mm PS XLPE high flexion articular insert, and 26 mm patellar component.    ANESTHESIA: General     INDICATIONS FOR PROCEDURE:  The patient was brought to the operating room for elective total knee replacement for advanced degenerative osteoarthritis.  The patient received IV antibiotics preoperatively.  These will be continued for 24 hours.  The patient also will receive anticoagulants  for postoperative thrombosis prophylaxis.  The patient understands the indications, alternatives, risks, benefits, and time involved for recovery and wishes to proceed.  The patient is consented for the procedure.      DESCRIPTION OF PROCEDURE:  The patient was brought to the operating room  and following suitable General anesthesia, the right knee was prepped and draped in the usual manner.  Full timeout was carried out and the patient and proper extremity and operative site identified and confirmed by all members of the operative team.    We next exsanguinated the operative leg with a Adi bandage and the tourniquet was elevated to 350 mmHg on the thigh.    She had severe valgus deformity along with 25 degrees of hyperextension due to lateral femoral condyle hypoplasia and severe lateral compartment wear.    A 10 cm longitudinal incision was made anteriorly for the MIS technique.  Sharp dissection was carried down through the subcutaneous tissue.  A median parapatellar arthrotomy was carried out and the knee flexed to 90 degrees.    There was  moderate wear in the medial compartment and severe wear in the patellofemoral  compartment and extreme wear in the lateral compartment with lateral femoral hypoplasia.    The [employer] instrumentation was used.  The femur was cut in 6 degrees of valgus for a size 4N PS  implant.  The tibia was cut for a size 4 fully stemmed base plate.  The patella was cut for a 26 mm patellar button.    The trial components were removed from the knee.  The bone surfaces were prepared with jet lavage and careful drying technique.     The posterior capsule was injected for postoperative relief with 30 cc of saline, 30 cc of 0.2% Ropivacaine, and 15 mg of Toradol.       We then cemented the components with HD Simplex cement with Gentamycin beginning with the tibial baseplate, followed by the femoral component, followed by the patellar component.    The knee was held in extension with the trial insert in place in the tibia until the cement was set.  Once the cement was set up, the trial component was removed.  We selected the 21 mm PS XLPE high flexion articular insert.  This insert was secured and the knee checked one final time for motion, stability, alignment and soft tissue balance, all of which were excellent.    The wound was irrigated throughout with antibiotic solution using jet lavage.  The tourniquet was deflated prior to wound closure and all bleeding controlled with electrocautery.  .    The wound was closed over a medium Hemovac drain with spaced 0 Ethibond interrupted and V-Loc running suture in the parapatellar arthrotomy, 2-0 undyed Vicryl in subcutaneous tissue and skin staples in the skin.  A sterile soft dressing was applied.  The tourniquet deflated one final time.  The patient was taken to the PAR in satisfactory condition.  There were no known complications during the procedure.    A surgical assistant was medically necessary for this procedure for pre-op positioning as well as intra-op retraction and  extremity support and positioning.  He was present for the entire procedure.      KALYN MAGAÑA MD    CC  Kalyn Magaña MD          [phone] Fax

## 2018-05-21 NOTE — ANESTHESIA PROCEDURE NOTES
Peripheral nerve/Neuraxial procedure note : Adductor canal  Pre-Procedure  Performed by MANDEEP RUEDA  Location: pre-op      Pre-Anesthestic Checklist: patient identified, IV checked, site marked, risks and benefits discussed, informed consent, monitors and equipment checked, at physician/surgeon's request and post-op pain management    Timeout  Correct Patient: Yes   Correct Procedure: Yes   Correct Site: Yes   Correct Laterality: Yes   Correct Position: Yes   Site Marked: Yes   .   Procedure Documentation    .    Procedure:  right  Adductor canal.  Local skin infiltrated with 3 mL of 1% lidocaine.     Ultrasound used to identify targeted nerve, plexus, or vascular marker and placed a needle adjacent to it., Ultrasound was used to visualize the spread of the anesthetic in close proximity to the above stated nerve. A permanent image is entered into the patient's record.  Patient Prep;mask, sterile gloves, chlorhexidine gluconate and isopropyl alcohol, patient draped.  Nerve Stim: Initial Level 1 mA. Lowest motor response mA..  Needle: insulated, short bevel Needle Gauge: 20.    Needle Length (Inches) 2  .       Assessment/Narrative  Paresthesias: No.  .  The placement was negative for: blood aspirated, painful injection and site bleeding.  Bolus given via needle..   Secured via.   Complications: none. Comments:  Single shot femoral nerve block  30 ml 0.5% Ropivacaine with 1:400,000 Epinephrine

## 2018-05-21 NOTE — OR NURSING
with pt throughout pacu stay. Clothing bag with pt on cart. VSS. Ok to transfer to floor per Dr Vides.

## 2018-05-21 NOTE — ANESTHESIA CARE TRANSFER NOTE
Patient: [facility]    Procedure(s):  RIGHT TOTAL KNEE ARTHROPLASTY  - Wound Class: I-Clean    Diagnosis: right knee djd  Diagnosis Additional Information: No value filed.    Anesthesia Type:   Spinal, Peripheral Nerve Block     Note:  Airway :Face Mask  Patient transferred to:PACU  Comments: At end of procedure, spontaneous respirations, adequate tidal volumes, followed commands to voice, LMA removed atraumatically, oropharynx suctioned, airway patent after LMA removal. Oxygen via facemask at 6 liters per minute to PACU. Oxygen tubing connected to wall O2 in PACU, SpO2, NiBP, and EKG monitors and alarms on and functioning, Donna Hugger warmer connected to patient gown, report on patient's clinical status given to PACU RN, RN questions answered.Handoff Report: Identifed the Patient, Identified the Reponsible Provider, Reviewed the pertinent medical history, Discussed the surgical course, Reviewed Intra-OP anesthesia mangement and issues during anesthesia, Set expectations for post-procedure period and Allowed opportunity for questions and acknowledgement of understanding      Vitals: (Last set prior to Anesthesia Care Transfer)    CRNA VITALS  5/21/2018 1509 - 5/21/2018 1543      5/21/2018             NIBP: 121/82    NIBP Mean: 94                Electronically Signed By: CECY Peterson CRNA  May 21, 2018  3:43 PM

## 2018-05-21 NOTE — ANESTHESIA PREPROCEDURE EVALUATION
Procedure: Procedure(s):  ARTHROPLASTY KNEE  Preop diagnosis: right knee djd    Allergies   Allergen Reactions     Oxycodone Nausea and Vomiting     Tramadol Nausea and Vomiting     Past Medical History:   Diagnosis Date     Chronic pain of right knee      Glaucoma      Hypertension      No past surgical history on file.  Social History   Substance Use Topics     Smoking status: Never Smoker     Smokeless tobacco: Never Used     Alcohol use No     Prior to Admission medications    Medication Sig Start Date End Date Taking? Authorizing Provider   Acetaminophen (TYLENOL PO) Take 325-650 mg by mouth every 4 hours as needed for mild pain or fever   Yes Reported, Patient   AMLODIPINE BESYLATE PO Take 5 mg by mouth every morning    Yes Reported, Patient   brimonidine (ALPHAGAN P) 0.1 % ophthalmic solution Place 1 drop into both eyes 2 times daily   Yes Reported, Patient   capsicum oleoresin 0.025 % cream Apply topically 4 times daily as needed (pain)   Yes Reported, Patient   IBUPROFEN PO Take 400 mg by mouth 3 times daily as needed    Yes Reported, Patient   latanoprost (XALATAN) 0.005 % ophthalmic solution Place 1 drop into both eyes At Bedtime   Yes Reported, Patient   NAPROXEN PO Take 500 mg by mouth 2 times daily as needed for moderate pain   Yes Reported, Patient   timolol (TIMOPTIC) 0.5 % ophthalmic solution Place 1 drop into both eyes every morning    Yes Reported, Patient     Current Facility-Administered Medications Ordered in Epic   Medication Dose Route Frequency Last Rate Last Dose     ceFAZolin (ANCEF) 1 g vial to attach to  ml bag for ADULT or 50 ml bag for PEDS  1 g Intravenous See Admin Instructions         ceFAZolin (ANCEF) intermittent infusion 2 g in 100 mL dextrose PRE-MIX  2 g Intravenous Pre-Op/Pre-procedure x 1 dose         fentaNYL (PF) (SUBLIMAZE) injection 25-50 mcg  25-50 mcg Intravenous Q2 Min PRN         HYDROmorphone (PF) (DILAUDID) injection 0.3-0.5 mg  0.3-0.5 mg Intravenous Q5 Min  PRN         lactated ringers infusion   Intravenous Continuous 25 mL/hr at 05/21/18 1244       lactated ringers infusion   Intravenous Continuous         lidocaine 1 % 1 mL  1 mL Other Q1H PRN         ondansetron (ZOFRAN-ODT) ODT tab 4 mg  4 mg Oral Q30 Min PRN        Or     ondansetron (ZOFRAN) injection 4 mg  4 mg Intravenous Q30 Min PRN         prochlorperazine (COMPAZINE) injection 10 mg  10 mg Intravenous Q6H PRN         sodium chloride (PF) 0.9% PF flush 3 mL  3 mL Intracatheter Q8H         tranexamic acid (CYKLOKAPRON) 1 g in sodium chloride 0.9 % 60 mL bolus  1 g Intravenous Once         No current Epic-ordered outpatient prescriptions on file.       lactated ringers 25 mL/hr at 05/21/18 1244     lactated ringers       Wt Readings from Last 1 Encounters:   05/21/18 57.6 kg (127 lb)     Temp Readings from Last 1 Encounters:   05/21/18 36.7  C (98  F) (Temporal)     BP Readings from Last 6 Encounters:   05/21/18 144/89     Pulse Readings from Last 4 Encounters:   No data found for Pulse     Resp Readings from Last 1 Encounters:   05/21/18 18     SpO2 Readings from Last 1 Encounters:   05/21/18 100%     Recent Labs   Lab Test  04/23/18   1208   CR  0.77     No results for input(s): AST, ALT, ALKPHOS, BILITOTAL, LIPASE in the last 26429 hours.  Recent Labs   Lab Test  04/23/18   1208   WBC  4.5   HGB  12.7  Canceled, Test credited   PLT  205     No results for input(s): ABO, RH in the last 55498 hours.  No results for input(s): INR, PTT in the last 14607 hours.   No results for input(s): TROPI in the last 82596 hours.  No results for input(s): PH, PCO2, PO2, HCO3 in the last 89200 hours.  No results for input(s): HCG in the last 34152 hours.  No results found for this or any previous visit (from the past 744 hour(s)).    RECENT LABS:   ECG:   ECHO:     Anesthesia Evaluation     . Pt has not had prior anesthetic            ROS/MED HX    ENT/Pulmonary:      (-) tobacco use and sleep apnea   Neurologic:        Cardiovascular:     (+) hypertension----. : . . . :. .       METS/Exercise Tolerance:     Hematologic:         Musculoskeletal:   (+) arthritis, , , -       GI/Hepatic:        (-) GERD   Renal/Genitourinary:         Endo:         Psychiatric:         Infectious Disease:         Malignancy:         Other:                     Physical Exam  Normal systems: cardiovascular and pulmonary    Airway   Mallampati: I  Neck ROM: full    Dental   (+) caps    Cardiovascular       Pulmonary                     Anesthesia Plan      History & Physical Review  History and physical reviewed and following examination; no interval change.    ASA Status:  2 .    NPO Status:  > 8 hours    Plan for General, LMA and Peripheral Nerve Block with Intravenous induction. Maintenance will be Balanced.    PONV prophylaxis:  Ondansetron (or other 5HT-3)       Postoperative Care  Postoperative pain management:  IV analgesics and Peripheral nerve block (Single Shot).      Consents  Anesthetic plan, risks, benefits and alternatives discussed with:  Patient..            Procedure: Procedure(s):  ARTHROPLASTY KNEE  Preop diagnosis: right knee djd    Allergies   Allergen Reactions     Oxycodone Nausea and Vomiting     Tramadol Nausea and Vomiting     Past Medical History:   Diagnosis Date     Chronic pain of right knee      Glaucoma      Hypertension      No past surgical history on file.  Social History   Substance Use Topics     Smoking status: Never Smoker     Smokeless tobacco: Never Used     Alcohol use No     Prior to Admission medications    Medication Sig Start Date End Date Taking? Authorizing Provider   Acetaminophen (TYLENOL PO) Take 325-650 mg by mouth every 4 hours as needed for mild pain or fever   Yes Reported, Patient   AMLODIPINE BESYLATE PO Take 5 mg by mouth every morning    Yes Reported, Patient   brimonidine (ALPHAGAN P) 0.1 % ophthalmic solution Place 1 drop into both eyes 2 times daily   Yes Reported, Patient   capsicum  oleoresin 0.025 % cream Apply topically 4 times daily as needed (pain)   Yes Reported, Patient   IBUPROFEN PO Take 400 mg by mouth 3 times daily as needed    Yes Reported, Patient   latanoprost (XALATAN) 0.005 % ophthalmic solution Place 1 drop into both eyes At Bedtime   Yes Reported, Patient   NAPROXEN PO Take 500 mg by mouth 2 times daily as needed for moderate pain   Yes Reported, Patient   timolol (TIMOPTIC) 0.5 % ophthalmic solution Place 1 drop into both eyes every morning    Yes Reported, Patient     Current Facility-Administered Medications Ordered in Epic   Medication Dose Route Frequency Last Rate Last Dose     ceFAZolin (ANCEF) 1 g vial to attach to  ml bag for ADULT or 50 ml bag for PEDS  1 g Intravenous See Admin Instructions         ceFAZolin 1000 mg + gentamicin 120 mg + NaCl 0.9% 1000 mL Bag    PRN   1,000 mL at 05/21/18 1338     dexamethasone (DECADRON) injection    PRN   4 mg at 05/21/18 1316     fentaNYL (PF) (SUBLIMAZE) injection 25-50 mcg  25-50 mcg Intravenous Q2 Min PRN   50 mcg at 05/21/18 1427     HYDROmorphone (PF) (DILAUDID) injection 0.3-0.5 mg  0.3-0.5 mg Intravenous Q5 Min PRN   0.5 mg at 05/21/18 1347     lactated ringers infusion   Intravenous Continuous 25 mL/hr at 05/21/18 1244       lactated ringers infusion   Intravenous Continuous         lidocaine 1 % 1 mL  1 mL Other Q1H PRN         midazolam (VERSED) injection    PRN   1 mg at 05/21/18 1305     ondansetron (ZOFRAN-ODT) ODT tab 4 mg  4 mg Oral Q30 Min PRN        Or     ondansetron (ZOFRAN) injection 4 mg  4 mg Intravenous Q30 Min PRN   4 mg at 05/21/18 1508     phenylephrine (ROYER-SYNEPHRINE) injection 1 mg  1 mg  Continuous PRN   100 mcg at 05/21/18 1514     prochlorperazine (COMPAZINE) injection 10 mg  10 mg Intravenous Q6H PRN         propofol (DIPRIVAN) infusion   Intravenous Continuous PRN   Stopped at 05/21/18 1459     ropivacaine 0.2% 30 mL + NaCl 0.9% 30 mL + ketorolac 15 mg    PRN   60.5 mL at 05/21/18 1515      sodium chloride (PF) 0.9% PF flush 3 mL  3 mL Intracatheter Q8H         sodium chloride 0.9% (bottle) irrigation    PRN   1,000 mL at 05/21/18 1338     No current Epic-ordered outpatient prescriptions on file.       lactated ringers 25 mL/hr at 05/21/18 1244     lactated ringers       Wt Readings from Last 1 Encounters:   05/21/18 57.6 kg (127 lb)     Temp Readings from Last 1 Encounters:   05/21/18 36.7  C (98  F) (Temporal)     BP Readings from Last 6 Encounters:   05/21/18 144/89     Pulse Readings from Last 4 Encounters:   No data found for Pulse     Resp Readings from Last 1 Encounters:   05/21/18 18     SpO2 Readings from Last 1 Encounters:   05/21/18 100%     Recent Labs   Lab Test  05/21/18   1235  04/23/18   1208   NA  141   --    POTASSIUM  3.5   --    CHLORIDE  107   --    CO2  30   --    ANIONGAP  4   --    GLC  85   --    BUN  14   --    CR  0.62  0.77   ROGER  9.1   --      No results for input(s): AST, ALT, ALKPHOS, BILITOTAL, LIPASE in the last 52470 hours.  Recent Labs   Lab Test  05/21/18   1235  04/23/18   1208   WBC   --   4.5   HGB  13.4  12.7  Canceled, Test credited   PLT   --   205     No results for input(s): ABO, RH in the last 47262 hours.  No results for input(s): INR, PTT in the last 20511 hours.   No results for input(s): TROPI in the last 98998 hours.  No results for input(s): PH, PCO2, PO2, HCO3 in the last 14731 hours.  No results for input(s): HCG in the last 03387 hours.  No results found for this or any previous visit (from the past 744 hour(s)).    RECENT LABS:   ECG:   ECHO:                 .

## 2018-05-22 ENCOUNTER — APPOINTMENT (OUTPATIENT)
Dept: PHYSICAL THERAPY | Facility: CLINIC | Age: 63
DRG: 470 | End: 2018-05-22
Attending: ORTHOPAEDIC SURGERY
Payer: COMMERCIAL

## 2018-05-22 LAB
ANION GAP SERPL CALCULATED.3IONS-SCNC: 5 MMOL/L (ref 3–14)
BUN SERPL-MCNC: 15 MG/DL (ref 7–30)
CALCIUM SERPL-MCNC: 8.4 MG/DL (ref 8.5–10.1)
CHLORIDE SERPL-SCNC: 107 MMOL/L (ref 94–109)
CO2 SERPL-SCNC: 29 MMOL/L (ref 20–32)
CREAT SERPL-MCNC: 0.6 MG/DL (ref 0.52–1.04)
GFR SERPL CREATININE-BSD FRML MDRD: >90 ML/MIN/1.7M2
GLUCOSE SERPL-MCNC: 122 MG/DL (ref 70–99)
HGB BLD-MCNC: 10.5 G/DL (ref 11.7–15.7)
PLATELET # BLD AUTO: 133 10E9/L (ref 150–450)
POTASSIUM SERPL-SCNC: 3.8 MMOL/L (ref 3.4–5.3)
SODIUM SERPL-SCNC: 141 MMOL/L (ref 133–144)

## 2018-05-22 PROCEDURE — 97530 THERAPEUTIC ACTIVITIES: CPT | Mod: GP | Performed by: PHYSICAL THERAPIST

## 2018-05-22 PROCEDURE — 36415 COLL VENOUS BLD VENIPUNCTURE: CPT | Performed by: ORTHOPAEDIC SURGERY

## 2018-05-22 PROCEDURE — 80048 BASIC METABOLIC PNL TOTAL CA: CPT | Performed by: ORTHOPAEDIC SURGERY

## 2018-05-22 PROCEDURE — 40000193 ZZH STATISTIC PT WARD VISIT: Performed by: PHYSICAL THERAPIST

## 2018-05-22 PROCEDURE — 97110 THERAPEUTIC EXERCISES: CPT | Mod: GP | Performed by: PHYSICAL THERAPIST

## 2018-05-22 PROCEDURE — 25000128 H RX IP 250 OP 636: Performed by: ORTHOPAEDIC SURGERY

## 2018-05-22 PROCEDURE — 97162 PT EVAL MOD COMPLEX 30 MIN: CPT | Mod: GP | Performed by: PHYSICAL THERAPIST

## 2018-05-22 PROCEDURE — 97116 GAIT TRAINING THERAPY: CPT | Mod: GP | Performed by: PHYSICAL THERAPIST

## 2018-05-22 PROCEDURE — 25000125 ZZHC RX 250: Performed by: ORTHOPAEDIC SURGERY

## 2018-05-22 PROCEDURE — 25000132 ZZH RX MED GY IP 250 OP 250 PS 637: Performed by: ORTHOPAEDIC SURGERY

## 2018-05-22 PROCEDURE — 85018 HEMOGLOBIN: CPT | Performed by: ORTHOPAEDIC SURGERY

## 2018-05-22 PROCEDURE — 85049 AUTOMATED PLATELET COUNT: CPT | Performed by: ORTHOPAEDIC SURGERY

## 2018-05-22 PROCEDURE — 12000007 ZZH R&B INTERMEDIATE

## 2018-05-22 PROCEDURE — 25000132 ZZH RX MED GY IP 250 OP 250 PS 637: Performed by: PHYSICIAN ASSISTANT

## 2018-05-22 RX ORDER — TRAMADOL HYDROCHLORIDE 50 MG/1
50 TABLET ORAL EVERY 6 HOURS PRN
Status: DISCONTINUED | OUTPATIENT
Start: 2018-05-22 | End: 2018-05-22

## 2018-05-22 RX ADMIN — AMLODIPINE BESYLATE 5 MG: 5 TABLET ORAL at 08:06

## 2018-05-22 RX ADMIN — LATANOPROST 1 DROP: 50 SOLUTION/ DROPS OPHTHALMIC at 00:11

## 2018-05-22 RX ADMIN — ACETAMINOPHEN 975 MG: 325 TABLET ORAL at 16:06

## 2018-05-22 RX ADMIN — TIMOLOL MALEATE 1 DROP: 5 SOLUTION/ DROPS OPHTHALMIC at 08:09

## 2018-05-22 RX ADMIN — KETOROLAC TROMETHAMINE 30 MG: 30 INJECTION, SOLUTION INTRAMUSCULAR at 04:38

## 2018-05-22 RX ADMIN — BRIMONIDINE TARTRATE 1 DROP: 1 SOLUTION/ DROPS OPHTHALMIC at 20:06

## 2018-05-22 RX ADMIN — ENOXAPARIN SODIUM 40 MG: 40 INJECTION SUBCUTANEOUS at 10:51

## 2018-05-22 RX ADMIN — HYDROMORPHONE HYDROCHLORIDE 2 MG: 2 TABLET ORAL at 12:19

## 2018-05-22 RX ADMIN — BRIMONIDINE TARTRATE 1 DROP: 1 SOLUTION/ DROPS OPHTHALMIC at 08:09

## 2018-05-22 RX ADMIN — BRIMONIDINE TARTRATE 1 DROP: 1 SOLUTION/ DROPS OPHTHALMIC at 00:11

## 2018-05-22 RX ADMIN — HYDROMORPHONE HYDROCHLORIDE 0.5 MG: 1 INJECTION, SOLUTION INTRAMUSCULAR; INTRAVENOUS; SUBCUTANEOUS at 21:50

## 2018-05-22 RX ADMIN — ACETAMINOPHEN 975 MG: 325 TABLET ORAL at 08:07

## 2018-05-22 RX ADMIN — ONDANSETRON 4 MG: 2 INJECTION INTRAMUSCULAR; INTRAVENOUS at 00:11

## 2018-05-22 RX ADMIN — KETOROLAC TROMETHAMINE 30 MG: 30 INJECTION, SOLUTION INTRAMUSCULAR at 10:51

## 2018-05-22 RX ADMIN — CEFAZOLIN SODIUM 1 G: 1 INJECTION, POWDER, FOR SOLUTION INTRAMUSCULAR; INTRAVENOUS at 04:40

## 2018-05-22 RX ADMIN — ONDANSETRON 4 MG: 4 TABLET, ORALLY DISINTEGRATING ORAL at 18:28

## 2018-05-22 RX ADMIN — CYCLOBENZAPRINE HYDROCHLORIDE 10 MG: 10 TABLET, FILM COATED ORAL at 18:28

## 2018-05-22 RX ADMIN — SENNOSIDES AND DOCUSATE SODIUM 2 TABLET: 8.6; 5 TABLET ORAL at 20:06

## 2018-05-22 RX ADMIN — SENNOSIDES AND DOCUSATE SODIUM 2 TABLET: 8.6; 5 TABLET ORAL at 08:06

## 2018-05-22 RX ADMIN — LATANOPROST 1 DROP: 50 SOLUTION/ DROPS OPHTHALMIC at 21:51

## 2018-05-22 NOTE — PLAN OF CARE
Problem: Knee Arthroplasty (Total, Partial) (Adult)  Goal: Signs and Symptoms of Listed Potential Problems Will be Absent, Minimized or Managed (Knee Arthroplasty)  Signs and symptoms of listed potential problems will be absent, minimized or managed by discharge/transition of care (reference Knee Arthroplasty (Total, Partial) (Adult) CPG).   Outcome: No Change  Pt A/Ox4, speaks Igbo. Interpretor services unable to find  daughter translated, call center reported they would have an interpretor 5/23 0900 - 1400. Dressing changed, reinforced d/t drainage from drain site. Voiding adequately. CMS intact. Regular diet tolerated. Carmelo stockings applied.

## 2018-05-22 NOTE — PROGRESS NOTES
05/22/18 1100   Quick Adds   Type of Visit Initial PT Evaluation   Living Environment   Lives With child(payam), adult;grandchild(payam)   Living Arrangements house   Home Accessibility stairs within home;stairs to enter home   Number of Stairs to Enter Home 8   Number of Stairs Within Home 12  (lower level, optional)   Stair Railings at Home outside, present on left side   Transportation Available car;family or friend will provide   Self-Care   Equipment Currently Used at Home cane, straight   Functional Level Prior   Ambulation 1-->assistive equipment   Transferring 0-->independent   Toileting 0-->independent   Bathing 0-->independent   Dressing 0-->independent   Eating 0-->independent   Communication 0-->understands/communicates without difficulty   Swallowing 0-->swallows foods/liquids without difficulty   Cognition 0 - no cognition issues reported   Fall history within last six months no   Which of the above functional risks had a recent onset or change? none   General Information   Onset of Illness/Injury or Date of Surgery - Date 05/21/18   Referring Physician Jimi Romero   Patient/Family Goals Statement pt and dtr plan on dc to home   Pertinent History of Current Problem (include personal factors and/or comorbidities that impact the POC) s/p TKA   Precautions/Limitations fall precautions   Weight-Bearing Status - LUE full weight-bearing   Weight-Bearing Status - RUE full weight-bearing   Weight-Bearing Status - LLE full weight-bearing   Weight-Bearing Status - RLE weight-bearing as tolerated   General Info Comments Pt dtr present and agreeable to A with translation due to no  available, pt in agreement   Cognitive Status Examination   Orientation orientation to person, place and time   Level of Consciousness alert   Cognitive Comment difficult to assess with language barrier, appears intact   Pain Assessment   Patient Currently in Pain Yes, see Vital Sign flowsheet  (6/10)   Integumentary/Edema  "  Integumentary/Edema Comments R knee per surgery   Posture    Posture Protracted shoulders;Forward head position   Range of Motion (ROM)   ROM Comment R LE limited s/p TKA 10-75 degrees AAROM   Strength   Strength Comments R LE limited s/p TKA with pain, I SAQ's and min A for SLR, but able to move LE into bed   Bed Mobility   Bed Mobility Comments bed mob with SBA and A for set up   Transfer Skills   Transfer Comments sit to stand with SBA and FWW   Gait   Gait Comments amb with FWW with SBA and cues for gait pattern   Balance   Balance Comments impaired standing static and dynamice balance, requires B UE support to maintain   Coordination   Coordination no deficits were identified   General Therapy Interventions   Planned Therapy Interventions bed mobility training;gait training;ROM;strengthening;stretching;transfer training   Clinical Impression   Criteria for Skilled Therapeutic Intervention yes, treatment indicated   PT Diagnosis difficutly walking   Influenced by the following impairments post op pain, decreased ROM, strength, balance, act jenna, lang barrier   Functional limitations due to impairments impaired functional mob I and safety   Clinical Presentation Evolving/Changing   Clinical Presentation Rationale 3-5 deficits   Clinical Decision Making (Complexity) Moderate complexity   Therapy Frequency` 2 times/day   Predicted Duration of Therapy Intervention (days/wks) 3 day   Anticipated Equipment Needs at Discharge front wheeled walker   Anticipated Discharge Disposition Home with Assist;Home with Outpatient Therapy   Risk & Benefits of therapy have been explained Yes   Patient, Family & other staff in agreement with plan of care Yes   [facility] [employer] TM \"6 Clicks\"   2016, Trustees of [facility], under license to [employer].  All rights reserved.   6 Clicks Short Forms Basic Mobility Inpatient Short Form   [facility] AM-PAC  \"6 Clicks\" V.2 Basic Mobility Inpatient Short Form   1. " Turning from your back to your side while in a flat bed without using bedrails? 4 - None   2. Moving from lying on your back to sitting on the side of a flat bed without using bedrails? 4 - None   3. Moving to and from a bed to a chair (including a wheelchair)? 4 - None   4. Standing up from a chair using your arms (e.g., wheelchair, or bedside chair)? 4 - None   5. To walk in hospital room? 4 - None   6. Climbing 3-5 steps with a railing? 3 - A Little   Basic Mobility Raw Score (Score out of 24.Lower scores equate to lower levels of function) 23   Total Evaluation Time   Total Evaluation Time (Minutes) 13

## 2018-05-22 NOTE — PLAN OF CARE
Problem: Patient Care Overview  Goal: Plan of Care/Patient Progress Review  Outcome: Improving  Pt is A&Ox4, lethargic most of the shift. Igbo (Ibo)speaking, daughter @ bedside. Up w/ one assist to BSC. Voiding adequately. Emesis x2 this shift relieved w/ zofran. Pain well managed w/ IV dilaudid and scheduled toradol. Hemovac patent. Will continue to monitor.

## 2018-05-22 NOTE — PROGRESS NOTES
[facility] Orthopedic Post-Op / Progress Note  Thelma Leon is a 62 year old female    Today's Date:2018  Admission Date: 2018  POD # 1 Right TKA         Interval History:   doing well  Good pain control and mobilizing nicely            Physical Exam:   All vitals have been reviewed  Temperatures:  Current - Temp: 97.8  F (36.6  C); Max - Temp  Av.6  F (36.4  C)  Min: 96.6  F (35.9  C)  Max: 98.4  F (36.9  C)  Pulse range: No Data Recorded  Blood pressure range: Systolic (24hrs), Av , Min:105 , Max:128   ; Diastolic (24hrs), Av, Min:56, Max:75      Intake/Output Summary (Last 24 hours) at 18 1233  Last data filed at 18 0630   Gross per 24 hour   Intake             2780 ml   Output             1420 ml   Net             1360 ml       Wound clean and dry with minimal or no drainage.  Surrounding skin with minimal erythema.  Dressing dry and intact.          Data:   All laboratory data related to this surgery reviewed      Lab Results   Component Value Date     2018    POTASSIUM 3.8 2018    CHLORIDE 107 2018    CO2 29 2018     (H) 2018     Lab Results   Component Value Date    HGB 10.5 (L) 2018    HGB 13.4 2018    HGB Canceled, Test credited 2018    HGB 12.7 2018     Platelet Count (10e9/L)   Date Value   2018 133 (L)   2018 171   2018 205       All imaging studies related to this surgery reviewed  Hardware is intact and in good approximation         Assessment and Plan:    Assessment:  Doing well.  No immediate surgical complications identified.  No excessive bleeding  Pain well-controlled.  Tolerating physical therapy and rehabilitation well.    Plan:  Continue physical therapy  Pain control measures  Discharge plan: Home with daughter in 1-2 days    Jimi Romero MD

## 2018-05-22 NOTE — PLAN OF CARE
Problem: Patient Care Overview  Goal: Plan of Care/Patient Progress Review  PT eval completed and treatment initiated,  Pt admitted for TKA.   Pt's previous level of function was I with some use of SEC, she lives at her dtr's house with grandchildren, 8 steps to enter with 1 rail.   Discharge Planner PT   Patient plan for discharge: home with A as needed, OP PT  Current status: pt able to complete bed mob and toilet transfer with SBA/mod I, amb with SBA with FWW with slow steady gait, pain  6/10,  AAROM 10-75 degrees, I SAQ's, min/CGA for SLR.  KI on at night  Barriers to return to prior living situation: stairs to enter, will bed addressed in PT, limit jenna for gait  Recommendations for discharge: home with OP PT  Rationale for recommendations: to maximize surgical outcome and functional mob I and safety       Entered by: ARNULFO SAHU 05/22/2018 11:56 AM

## 2018-05-23 ENCOUNTER — APPOINTMENT (OUTPATIENT)
Dept: OCCUPATIONAL THERAPY | Facility: CLINIC | Age: 63
DRG: 470 | End: 2018-05-23
Attending: ORTHOPAEDIC SURGERY
Payer: COMMERCIAL

## 2018-05-23 ENCOUNTER — APPOINTMENT (OUTPATIENT)
Dept: PHYSICAL THERAPY | Facility: CLINIC | Age: 63
DRG: 470 | End: 2018-05-23
Attending: ORTHOPAEDIC SURGERY
Payer: COMMERCIAL

## 2018-05-23 LAB — HGB BLD-MCNC: 9.1 G/DL (ref 11.7–15.7)

## 2018-05-23 PROCEDURE — 97165 OT EVAL LOW COMPLEX 30 MIN: CPT | Mod: GO

## 2018-05-23 PROCEDURE — 36415 COLL VENOUS BLD VENIPUNCTURE: CPT | Performed by: ORTHOPAEDIC SURGERY

## 2018-05-23 PROCEDURE — 40000133 ZZH STATISTIC OT WARD VISIT

## 2018-05-23 PROCEDURE — 25000125 ZZHC RX 250: Performed by: ORTHOPAEDIC SURGERY

## 2018-05-23 PROCEDURE — 97530 THERAPEUTIC ACTIVITIES: CPT | Mod: GO

## 2018-05-23 PROCEDURE — 40000193 ZZH STATISTIC PT WARD VISIT

## 2018-05-23 PROCEDURE — 25000132 ZZH RX MED GY IP 250 OP 250 PS 637: Performed by: PHYSICIAN ASSISTANT

## 2018-05-23 PROCEDURE — 97110 THERAPEUTIC EXERCISES: CPT | Mod: GP

## 2018-05-23 PROCEDURE — 12000007 ZZH R&B INTERMEDIATE

## 2018-05-23 PROCEDURE — 25000128 H RX IP 250 OP 636: Performed by: ORTHOPAEDIC SURGERY

## 2018-05-23 PROCEDURE — 85018 HEMOGLOBIN: CPT | Performed by: ORTHOPAEDIC SURGERY

## 2018-05-23 PROCEDURE — 25000132 ZZH RX MED GY IP 250 OP 250 PS 637: Performed by: ORTHOPAEDIC SURGERY

## 2018-05-23 PROCEDURE — 97116 GAIT TRAINING THERAPY: CPT | Mod: GP

## 2018-05-23 PROCEDURE — 97535 SELF CARE MNGMENT TRAINING: CPT | Mod: GO

## 2018-05-23 RX ORDER — HYDROMORPHONE HYDROCHLORIDE 2 MG/1
2-4 TABLET ORAL
Status: DISCONTINUED | OUTPATIENT
Start: 2018-05-23 | End: 2018-05-24 | Stop reason: HOSPADM

## 2018-05-23 RX ORDER — AMOXICILLIN 250 MG
2 CAPSULE ORAL 2 TIMES DAILY
Qty: 50 TABLET | Refills: 0 | Status: SHIPPED | OUTPATIENT
Start: 2018-05-23

## 2018-05-23 RX ORDER — ONDANSETRON 4 MG/1
4 TABLET, ORALLY DISINTEGRATING ORAL EVERY 6 HOURS PRN
Qty: 20 TABLET | Refills: 0 | Status: SHIPPED | OUTPATIENT
Start: 2018-05-23

## 2018-05-23 RX ORDER — HYDROMORPHONE HYDROCHLORIDE 2 MG/1
2-4 TABLET ORAL
Qty: 30 TABLET | Refills: 0 | Status: SHIPPED | OUTPATIENT
Start: 2018-05-23

## 2018-05-23 RX ADMIN — ENOXAPARIN SODIUM 40 MG: 40 INJECTION SUBCUTANEOUS at 10:11

## 2018-05-23 RX ADMIN — ACETAMINOPHEN 975 MG: 325 TABLET ORAL at 00:16

## 2018-05-23 RX ADMIN — ACETAMINOPHEN 975 MG: 325 TABLET ORAL at 17:44

## 2018-05-23 RX ADMIN — ONDANSETRON 4 MG: 4 TABLET, ORALLY DISINTEGRATING ORAL at 08:55

## 2018-05-23 RX ADMIN — HYDROMORPHONE HYDROCHLORIDE 4 MG: 2 TABLET ORAL at 16:00

## 2018-05-23 RX ADMIN — ACETAMINOPHEN 975 MG: 325 TABLET ORAL at 08:55

## 2018-05-23 RX ADMIN — HYDROMORPHONE HYDROCHLORIDE 4 MG: 2 TABLET ORAL at 20:01

## 2018-05-23 RX ADMIN — HYDROMORPHONE HYDROCHLORIDE 4 MG: 2 TABLET ORAL at 13:06

## 2018-05-23 RX ADMIN — ONDANSETRON 4 MG: 4 TABLET, ORALLY DISINTEGRATING ORAL at 16:00

## 2018-05-23 RX ADMIN — PROCHLORPERAZINE MALEATE 10 MG: 5 TABLET, FILM COATED ORAL at 13:06

## 2018-05-23 RX ADMIN — SENNOSIDES AND DOCUSATE SODIUM 2 TABLET: 8.6; 5 TABLET ORAL at 20:01

## 2018-05-23 RX ADMIN — AMLODIPINE BESYLATE 5 MG: 5 TABLET ORAL at 08:55

## 2018-05-23 RX ADMIN — HYDROMORPHONE HYDROCHLORIDE 4 MG: 2 TABLET ORAL at 10:11

## 2018-05-23 RX ADMIN — BRIMONIDINE TARTRATE 1 DROP: 1 SOLUTION/ DROPS OPHTHALMIC at 20:01

## 2018-05-23 RX ADMIN — HYDROMORPHONE HYDROCHLORIDE 4 MG: 2 TABLET ORAL at 23:42

## 2018-05-23 RX ADMIN — CYCLOBENZAPRINE HYDROCHLORIDE 10 MG: 10 TABLET, FILM COATED ORAL at 04:39

## 2018-05-23 RX ADMIN — HYDROMORPHONE HYDROCHLORIDE 0.5 MG: 1 INJECTION, SOLUTION INTRAMUSCULAR; INTRAVENOUS; SUBCUTANEOUS at 05:33

## 2018-05-23 RX ADMIN — SENNOSIDES AND DOCUSATE SODIUM 2 TABLET: 8.6; 5 TABLET ORAL at 08:55

## 2018-05-23 ASSESSMENT — ACTIVITIES OF DAILY LIVING (ADL): PREVIOUS_RESPONSIBILITIES: MEAL PREP;HOUSEKEEPING;LAUNDRY;MEDICATION MANAGEMENT;FINANCES;DRIVING

## 2018-05-23 NOTE — PLAN OF CARE
Problem: Patient Care Overview  Goal: Plan of Care/Patient Progress Review  Discharge Planner PT   Patient plan for discharge: home with A as needed, OP PT  Current status: Supine to/from sit with min assist for R LE. Sit to/from stand with FWW and SBA. Amb 100 ft x 2 with FWW and SBA. Up/down 3 steps x 3; first with B rails and CGA, then with 1 rail and HHA and then with 1 rail and SEC. Pt reports minimal pain, but some dizziness that resolves with seated rest break. Tolerates TKA exs well. Pt returned supine at end of session with all needs in reach and bed alarm on.  Barriers to return to prior living situation: will need FWW issued for home use prior to discharge  Recommendations for discharge: home with OPPT per the plan established by the Physical Therapist   Rationale for recommendations: OPPT to progress strengthening, ROM and gait mechanics.       Entered by: Christa Trivedi 05/23/2018 12:47 PM

## 2018-05-23 NOTE — PLAN OF CARE
Problem: Patient Care Overview  Goal: Plan of Care/Patient Progress Review  OT:  not yet here at 1:45P for 1:30 eval as scheduled, will attempt to reschedule when present.

## 2018-05-23 NOTE — PROGRESS NOTES
[facility] Orthopedic Post-Op / Progress Note  Thelma Leon is a 62 year old female    Today's Date:2018  Admission Date: 2018  POD # 2 TKA         Interval History:   doing well  Having some nausea with pain meds.  Will go with po Dilaudid and Zofran            Physical Exam:   All vitals have been reviewed  Temperatures:  Current - Temp: 98.3  F (36.8  C); Max - Temp  Av.2  F (36.8  C)  Min: 97.5  F (36.4  C)  Max: 98.8  F (37.1  C)  Pulse range: No Data Recorded  Blood pressure range: Systolic (24hrs), Av , Min:112 , Max:133   ; Diastolic (24hrs), Av, Min:61, Max:76      Intake/Output Summary (Last 24 hours) at 18 1337  Last data filed at 18 0000   Gross per 24 hour   Intake              300 ml   Output                0 ml   Net              300 ml       Wound clean and dry with minimal or no drainage.  Surrounding skin with minimal erythema.          Data:   All laboratory data related to this surgery reviewed      Lab Results   Component Value Date     2018    POTASSIUM 3.8 2018    CHLORIDE 107 2018    CO2 29 2018     (H) 2018     Lab Results   Component Value Date    HGB 9.1 (L) 2018    HGB 10.5 (L) 2018    HGB 13.4 2018     Platelet Count (10e9/L)   Date Value   2018 133 (L)   2018 171   2018 205       All imaging studies related to this surgery reviewed         Assessment and Plan:    Assessment:  Doing well.  No immediate surgical complications identified.  No excessive bleeding  Pain well-controlled.  Tolerating physical therapy and rehabilitation well.    Plan:  Continue physical therapy  Pain control measures  Discharge plan: Home tomorrow with daughter    Jimi Romero MD

## 2018-05-23 NOTE — PLAN OF CARE
Problem: Patient Care Overview  Goal: Plan of Care/Patient Progress Review  Outcome: Improving  Pt is A&O, speaks Igbo (Igbo).Daughter @ bedside overnight. Up w/ assist of one/GB/walker to bathroom. Complained of severe pain, relieved w/ IV dilaudid. No complain of nausea. Pt's daughter requested for PO dilaudid on behalf of pt, reported to oncoming RN to address MD. Will continue to monitor.

## 2018-05-23 NOTE — PLAN OF CARE
Problem: Patient Care Overview  Goal: Plan of Care/Patient Progress Review  Discharge Planner OT   Patient plan for discharge: home w/family A  Current status: OT eval and treatment completed on POD#2 R TKA.  present. Training completed in modified bed, toilet transfers and recommendation made for raised toilet seat which pt will purchase on own from outside source if needed. Pt requiring minimal assist with LB dressing however anticipate she will be indep within 3-4 days and in meantime family available to assist. She performed bed mob with bed flat no rail SBA, toilet transfer SBA, amb to/from bathroom using walker SBA.  She verbalized understanding of safe walker use to be incorporated with retrieval of ADL  No further OT needed at that time; PT will cont to follow for mobility training and OT to be discharged.   Barriers to return to prior living situation: none noted from OT standpoint - see PT note  Recommendations for discharge: home w/family A  Rationale for recommendations: Pt lives with daughter and her , daughter is a nurse here, works days during which her  will be home with patient so that she will have 24o assist available. Pt met OT goals.        Entered by: Tommy Collier 05/23/2018 3:25 PM

## 2018-05-23 NOTE — DISCHARGE INSTRUCTIONS
DISCHARGE INSTRUCTIONS AFTER YOUR TOTAL KNEE REPLACEMENT     KALYN MAGAÑA MD       Instructions to care for your wound at home:   Change the dressing daily.  Inspect your incision at the time of dressing change for increased redness, tenderness, swelling, or drainage along the incision line.  Some bruising or discoloration is usually present, but call my office for any changes in appearance that concern you.  Also call if you develop a fever above 101 degrees.     You may shower directly over the wound beginning 4 days after your surgery, but do not submerge the wound under water until after your post operative visit when the sutures are removed and the wound is completely sealed without drainage.    Activities:  Physical activity may be resumed gradually according to your comfort level. You may bear weight on your operative leg as tolerated with your crutches or walker as instructed by your therapist.  Follow your home exercise program.  Ice your knee after exercising.        Wear your knee immobilizer at night only until your office visit in 2 weeks to maintain full knee extension.  Do not use the immobilizer during the day unless otherwise instructed.      Wear the anti-embolism stockings day and night until seen in the office for your post operative visit. Remove them twice daily for one hour at a time. Keep the compression stockings flat on your leg.  Do not allow them to roll up or crease your skin.  Call if you develop calf pain.     Outpatient Physical Therapy and home exercises:  Outpatient physical therapy visits are required following discharge from the hospital. The referral for these outpatient therapy visits is routinely given to you at the time of your surgical scheduling. You should have already scheduled your therapy sessions in advance.  If you have not done so, please immediately call the therapy site of your choice to schedule the physical therapy regimen that has been prescribed for you.  You  may discontinue the crutches or walker per the therapist's recommendation.      Medications:  New medications for you on discharge will include a pain medication, a stool softener while on the narcotic pain medication, and a blood thinner.  Detailed instructions will come with those medications.  You will also receive instructions on when to resume your home medications.     If you routinely take Aspirin 81 mg, hold the Aspirin while taking the formal blood thinner medication. Then take Aspirin 325 mg (1 tablet) daily for 4 weeks.  Then resume your Aspirin 81 mg daily if that is your routine.        Antibiotic coverage will be needed before any type of dental procedure.  This is a life long recommendation.  You should notify your dentist of your total knee surgery and call your dentist or our office one week before a dental appointment for antibiotics.        Clinic follow-up appointment:  Your clinic follow-up appointment has been prearranged.  Call [phone] with any questions.    Jimi Romero MD

## 2018-05-23 NOTE — PROGRESS NOTES
Pt declines to sign interpretor waiver, only using daughter as interpretor when necessary, but still requests hospital interpretor.

## 2018-05-23 NOTE — DISCHARGE SUMMARY
DISCHARGE SUMMARY    NAME:  Thelma Leon  AGE:  62 year old  YOB: 1955  MRN#:  [id number]    Thelma Leon was admitted for elective total knee arthroplasty.  The surgery was performed on 5/21/2018.  The postoperative course is documented in the medical record.  There were no complications. The patient was felt ready for discharge home with family with post-discharge physical therapy and outpatient visit prearranged.     Lab Results   Component Value Date    HGB 9.1 (L) 05/23/2018    HGB 10.5 (L) 05/22/2018    HGB 13.4 05/21/2018       The patient received 0 units transfusion.      FINAL DISCHARGE DIAGNOSIS:  Degenerative osteoarthritis right knee with severe valgus and hyperextension deformity.               Acute blood loss anemia     SURGICAL PROCEDURE THIS ADMISSION:  Right total knee arthroplasty.         KALYN MAGAÑA MD      CC: Fax [phone]         Kalyn Magaña MD

## 2018-05-23 NOTE — PROGRESS NOTES
05/23/18 1446   Quick Adds   Type of Visit Initial Occupational Therapy Evaluation   Living Environment   Lives With child(payam), adult;grandchild(payam)   Living Arrangements house   Home Accessibility stairs within home;stairs to enter home   Number of Stairs to Enter Home 8   Number of Stairs Within Home 12   Stair Railings at Home outside, present on left side   Transportation Available family or friend will provide   Living Environment Comment Pt has both walk-in shower and tub/shower avail   Self-Care   Usual Activity Tolerance good   Current Activity Tolerance fair   Equipment Currently Used at Home cane, straight   Functional Level Prior   Ambulation 1-->assistive equipment   Transferring 0-->independent   Toileting 0-->independent   Bathing 0-->independent   Dressing 0-->independent   Eating 0-->independent   Communication 0-->understands/communicates without difficulty   Swallowing 0-->swallows foods/liquids without difficulty   Cognition 0 - no cognition issues reported   Fall history within last six months no   Which of the above functional risks had a recent onset or change? none   General Information   Onset of Illness/Injury or Date of Surgery - Date 05/21/18   Referring Physician Dr. Jimi Romero   Patient/Family Goals Statement return home w/family assist   Additional Occupational Profile Info/Pertinent History of Current Problem s/p R TKA   Precautions/Limitations fall precautions   Weight-Bearing Status - RLE weight-bearing as tolerated   Cognitive Status Examination   Cognitive Comment Appears intact - via    Sensory Examination   Sensory Quick Adds No deficits were identified   Pain Assessment   Patient Currently in Pain Yes, see Vital Sign flowsheet  (4/10 R knee pain when ambulating)   Range of Motion (ROM)   ROM Quick Adds No deficits were identified   Strength   Manual Muscle Testing Quick Adds No deficits were identified   Coordination   Upper Extremity Coordination No deficits  were identified   Mobility   Bed Mobility Comments SBA bed flat no rail   Transfer Skill: Bed to Chair/Chair to Bed   Level of [place]: Bed to Chair stand-by assist   Physical Assist/Nonphysical Assist: Bed to Chair supervision   Weight-Bearing Restrictions weight-bearing as tolerated   Assistive Device - Transfer Skill Bed to Chair Chair to Bed Rehab Eval rolling walker   Transfer Skill: Sit to Stand   Level of [place]: Sit/Stand stand-by assist   Physical Assist/Nonphysical Assist: Sit/Stand supervision;verbal cues   Assistive Device for Transfer: Sit/Stand rolling walker   Transfer Skill: Toilet Transfer   Level of [place]: Toilet stand-by assist   Physical Assist/Nonphysical Assist: Toilet supervision;verbal cues   Weight-Bearing Restrictions: Toilet weight-bearing as tolerated   Assistive Device rolling walker   Balance   Balance Comments Indep sitting balance, SBA amb in room no LOB w/use of walker   Lower Body Dressing   Level of [place]: Dress Lower Body minimum assist (75% patients effort)   Physical Assist/Nonphysical Assist: Dress Lower Body 1 person assist  (family will A as needed ; anticipate indep 3-4 days)   Instrumental Activities of Daily Living (IADL)   Previous Responsibilities meal prep;housekeeping;laundry;medication management;finances;driving   IADL Comments Family avail 24/7 to A   Activities of Daily Living Analysis   Impairments Contributing to Impaired Activities of Daily Living balance impaired;pain;ROM decreased;strength decreased  (R knee)   General Therapy Interventions   Planned Therapy Interventions ADL retraining;IADL retraining;transfer training   Clinical Impression   Criteria for Skilled Therapeutic Interventions Met yes, treatment indicated   OT Diagnosis decreased ADL/IADL performance   Influenced by the following impairments post-op R knee pain, decreased ROM and strength, balance   Assessment of Occupational Performance 3-5 Performance Deficits  "  Identified Performance Deficits functional mob, dressing, household mgmt   Clinical Decision Making (Complexity) Low complexity   Predicted Duration of Therapy Intervention (days/wks) eval and one treatment session   Anticipated Equipment Needs at Discharge raised toilet seat   Anticipated Discharge Disposition Home with Assist   Risks and Benefits of Treatment have been explained. Yes   Patient, Family & other staff in agreement with plan of care Yes   [facility] TM \"6 Clicks\"   2016, Trustees of [facility], under license to [employer].  All rights reserved.   6 Clicks Short Forms Daily Activity Inpatient Short Form   [facility]  \"6 Clicks\" Daily Activity Inpatient Short Form   1. Putting on and taking off regular lower body clothing? 3 - A Little   2. Bathing (including washing, rinsing, drying)? 3 - A Little   3. Toileting, which includes using toilet, bedpan or urinal? 4 - None   4. Putting on and taking off regular upper body clothing? 4 - None   5. Taking care of personal grooming such as brushing teeth? 4 - None   6. Eating meals? 4 - None   Daily Activity Raw Score (Score out of 24.Lower scores equate to lower levels of function) 22   Total Evaluation Time   Total Evaluation Time (Minutes) 15     "

## 2018-05-23 NOTE — PLAN OF CARE
Problem: Knee Arthroplasty (Total, Partial) (Adult)  Goal: Signs and Symptoms of Listed Potential Problems Will be Absent, Minimized or Managed (Knee Arthroplasty)  Signs and symptoms of listed potential problems will be absent, minimized or managed by discharge/transition of care (reference Knee Arthroplasty (Total, Partial) (Adult) CPG).   Outcome: Improving  Pt A/Ox4. VSS. Up 1 assist w/ walker. Reports pain 4/10 using Norco with relief. PRN zofran used with pain medication to prevent nausea. Regular diet tolerated. CMS intact. Dressing changed. Voiding adequately.

## 2018-05-24 ENCOUNTER — APPOINTMENT (OUTPATIENT)
Dept: PHYSICAL THERAPY | Facility: CLINIC | Age: 63
DRG: 470 | End: 2018-05-24
Attending: ORTHOPAEDIC SURGERY
Payer: COMMERCIAL

## 2018-05-24 VITALS
SYSTOLIC BLOOD PRESSURE: 106 MMHG | TEMPERATURE: 97.1 F | BODY MASS INDEX: 20.41 KG/M2 | HEIGHT: 66 IN | WEIGHT: 127 LBS | DIASTOLIC BLOOD PRESSURE: 65 MMHG | OXYGEN SATURATION: 97 % | RESPIRATION RATE: 16 BRPM

## 2018-05-24 LAB
CREAT SERPL-MCNC: 0.65 MG/DL (ref 0.52–1.04)
GFR SERPL CREATININE-BSD FRML MDRD: >90 ML/MIN/1.7M2
PLATELET # BLD AUTO: 116 10E9/L (ref 150–450)

## 2018-05-24 PROCEDURE — 25000128 H RX IP 250 OP 636: Performed by: ORTHOPAEDIC SURGERY

## 2018-05-24 PROCEDURE — 97110 THERAPEUTIC EXERCISES: CPT | Mod: GP

## 2018-05-24 PROCEDURE — 36415 COLL VENOUS BLD VENIPUNCTURE: CPT | Performed by: ORTHOPAEDIC SURGERY

## 2018-05-24 PROCEDURE — 85049 AUTOMATED PLATELET COUNT: CPT | Performed by: ORTHOPAEDIC SURGERY

## 2018-05-24 PROCEDURE — 25000132 ZZH RX MED GY IP 250 OP 250 PS 637: Performed by: ORTHOPAEDIC SURGERY

## 2018-05-24 PROCEDURE — 97116 GAIT TRAINING THERAPY: CPT | Mod: GP

## 2018-05-24 PROCEDURE — 82565 ASSAY OF CREATININE: CPT | Performed by: ORTHOPAEDIC SURGERY

## 2018-05-24 PROCEDURE — 25000132 ZZH RX MED GY IP 250 OP 250 PS 637: Performed by: PHYSICIAN ASSISTANT

## 2018-05-24 PROCEDURE — 40000193 ZZH STATISTIC PT WARD VISIT

## 2018-05-24 RX ADMIN — HYDROMORPHONE HYDROCHLORIDE 4 MG: 2 TABLET ORAL at 08:10

## 2018-05-24 RX ADMIN — SENNOSIDES AND DOCUSATE SODIUM 2 TABLET: 8.6; 5 TABLET ORAL at 08:10

## 2018-05-24 RX ADMIN — ACETAMINOPHEN 975 MG: 325 TABLET ORAL at 03:19

## 2018-05-24 RX ADMIN — HYDROMORPHONE HYDROCHLORIDE 4 MG: 2 TABLET ORAL at 13:23

## 2018-05-24 RX ADMIN — ACETAMINOPHEN 975 MG: 325 TABLET ORAL at 11:16

## 2018-05-24 RX ADMIN — ENOXAPARIN SODIUM 40 MG: 40 INJECTION SUBCUTANEOUS at 11:17

## 2018-05-24 RX ADMIN — AMLODIPINE BESYLATE 5 MG: 5 TABLET ORAL at 08:10

## 2018-05-24 NOTE — PLAN OF CARE
Problem: Patient Care Overview  Goal: Plan of Care/Patient Progress Review  Outcome: Improving  Pt is A&O, [place] speaking. Up w/ assist of one/Gb/walker to bathroom. Pain well managed w/ PO dilaudid. No report of nausea. Progressing per plan of care. D/c today.

## 2018-05-24 NOTE — PLAN OF CARE
Problem: Knee Arthroplasty (Total, Partial) (Adult)  Goal: Signs and Symptoms of Listed Potential Problems Will be Absent, Minimized or Managed (Knee Arthroplasty)  Signs and symptoms of listed potential problems will be absent, minimized or managed by discharge/transition of care (reference Knee Arthroplasty (Total, Partial) (Adult) CPG).   Outcome: Adequate for Discharge Date Met: 05/24/18  Pt A/Ox4. VSS. Up 1 assist w/ walker. Reports pain 4/10 using PO dilaudid with relief. Regular diet tolerated. CMS intact. Dressing changed. Voiding adequately. D/c to home with daughter at 1445 via wheelchair. AVS and cost of medication reviewed with pt at d/c. Denies pain at d/c.

## 2018-05-24 NOTE — PLAN OF CARE
Problem: Patient Care Overview  Goal: Plan of Care/Patient Progress Review  Discharge Planner PT   Patient plan for discharge: home with A as needed, OP PT  Current status: Supine to/from sit with min assist for R LE. Sit to/from stand with FWW and SBA. Amb 100 ft x 2 with FWW and SBA. Up/down 3 steps x 1 with B rails and CGA/SBA. Tolerates TKA exs well. Pt returned supine at end of session with all needs in reach and bed alarm on. R knee AAROM 7-74 degrees.  Barriers to return to prior living situation: none noted at this time  Recommendations for discharge: home with OPPT per the plan established by the Physical Therapist   Rationale for recommendations: OPPT to progress strengthening, ROM and gait mechanics.       Entered by: Christa Trivedi 05/24/2018 2:04 PM     Pt discharging home with OPPT today.    PT goals not met.

## (undated) DEVICE — ESU PENCIL W/SMOKE EVAC NEPTUNE STRYKER 0703-046-000

## (undated) DEVICE — ESU GROUND PAD UNIVERSAL W/O CORD

## (undated) DEVICE — NDL SPINAL 18GA 3.5" 405184

## (undated) DEVICE — WRAP EZY KNEE

## (undated) DEVICE — GLOVE PROTEXIS W/NEU-THERA 8.0  2D73TE80

## (undated) DEVICE — GOWN IMPERVIOUS SPECIALTY XL/XLONG 39049

## (undated) DEVICE — SUCTION IRR SYSTEM W/O TIP INTERPULSE HANDPIECE 0210-100-000

## (undated) DEVICE — SU ETHIBOND 0 CTX CR  8X18" CX31D

## (undated) DEVICE — BONE CLEANING TIP INTERPULSE  0210-010-000

## (undated) DEVICE — LINEN TOWEL PACK X5 5464

## (undated) DEVICE — DRSG XEROFORM 5X9" 8884431605

## (undated) DEVICE — IMM KNEE 20" 0814-2660

## (undated) DEVICE — DRAIN ROUND W/RESERV KIT JACKSON PRATT 10FR 400ML SU130-402D

## (undated) DEVICE — PREP CHLORAPREP 26ML TINTED ORANGE  260815

## (undated) DEVICE — BLADE SAW SAGITTAL STRK 29X83.5X1.27MM 4/2000 2108-183-000

## (undated) DEVICE — CAST PADDING 6" UNSTERILE 9046

## (undated) DEVICE — SU VICRYL 2-0 CP-1 27" UND J266H

## (undated) DEVICE — GLOVE PROTEXIS POWDER FREE 8.5 ORTHOPEDIC 2D73ET85

## (undated) DEVICE — SOL WATER IRRIG 1000ML BOTTLE 2F7114

## (undated) DEVICE — BLADE CLIPPER 4412A

## (undated) DEVICE — MANIFOLD NEPTUNE 4 PORT 700-20

## (undated) DEVICE — TOURNIQUET CUFF 30" STERILE

## (undated) DEVICE — HOOD FLYTE W/PEELAWAY 408-800-100

## (undated) DEVICE — SYR 30ML LL W/O NDL

## (undated) DEVICE — DRSG KERLIX FLUFFS X5

## (undated) DEVICE — PACK TOTAL KNEE SOP15TKFSD

## (undated) DEVICE — GLOVE PROTEXIS POWDER FREE 8.0 ORTHOPEDIC 2D73ET80

## (undated) DEVICE — BONE CEMENT MIXEVAC III HI VAC KIT  0206-015-000

## (undated) DEVICE — BLADE SAW SAGITTAL STRK 21X90X1.27MM HD SYS 6 6221-127-090

## (undated) DEVICE — SU WND CLOSURE VLOC 180 ABS 0 24" GS-25 VLOCL0436

## (undated) RX ORDER — HYDROMORPHONE HYDROCHLORIDE 1 MG/ML
INJECTION, SOLUTION INTRAMUSCULAR; INTRAVENOUS; SUBCUTANEOUS
Status: DISPENSED
Start: 2018-05-21

## (undated) RX ORDER — ROPIVACAINE HYDROCHLORIDE 2 MG/ML
INJECTION, SOLUTION EPIDURAL; INFILTRATION; PERINEURAL
Status: DISPENSED
Start: 2018-05-21

## (undated) RX ORDER — CEFAZOLIN SODIUM 2 G/100ML
INJECTION, SOLUTION INTRAVENOUS
Status: DISPENSED
Start: 2018-05-21

## (undated) RX ORDER — KETOROLAC TROMETHAMINE 30 MG/ML
INJECTION, SOLUTION INTRAMUSCULAR; INTRAVENOUS
Status: DISPENSED
Start: 2018-05-21

## (undated) RX ORDER — PROPOFOL 10 MG/ML
INJECTION, EMULSION INTRAVENOUS
Status: DISPENSED
Start: 2018-05-21

## (undated) RX ORDER — ONDANSETRON 2 MG/ML
INJECTION INTRAMUSCULAR; INTRAVENOUS
Status: DISPENSED
Start: 2018-05-21

## (undated) RX ORDER — LIDOCAINE HYDROCHLORIDE 20 MG/ML
INJECTION, SOLUTION EPIDURAL; INFILTRATION; INTRACAUDAL; PERINEURAL
Status: DISPENSED
Start: 2018-05-21

## (undated) RX ORDER — DEXAMETHASONE SODIUM PHOSPHATE 4 MG/ML
INJECTION, SOLUTION INTRA-ARTICULAR; INTRALESIONAL; INTRAMUSCULAR; INTRAVENOUS; SOFT TISSUE
Status: DISPENSED
Start: 2018-05-21

## (undated) RX ORDER — FENTANYL CITRATE 50 UG/ML
INJECTION, SOLUTION INTRAMUSCULAR; INTRAVENOUS
Status: DISPENSED
Start: 2018-05-21

## (undated) RX ORDER — ACYCLOVIR 200 MG/1
CAPSULE ORAL
Status: DISPENSED
Start: 2018-05-21